# Patient Record
Sex: FEMALE | Race: WHITE | NOT HISPANIC OR LATINO | Employment: FULL TIME | URBAN - METROPOLITAN AREA
[De-identification: names, ages, dates, MRNs, and addresses within clinical notes are randomized per-mention and may not be internally consistent; named-entity substitution may affect disease eponyms.]

---

## 2017-02-08 ENCOUNTER — OFFICE VISIT (OUTPATIENT)
Dept: FAMILY MEDICINE CLINIC | Facility: CLINIC | Age: 22
End: 2017-02-08

## 2017-02-08 VITALS
HEIGHT: 64 IN | WEIGHT: 117 LBS | HEART RATE: 106 BPM | RESPIRATION RATE: 16 BRPM | SYSTOLIC BLOOD PRESSURE: 110 MMHG | OXYGEN SATURATION: 99 % | BODY MASS INDEX: 19.97 KG/M2 | DIASTOLIC BLOOD PRESSURE: 60 MMHG

## 2017-02-08 DIAGNOSIS — M25.511 ARTHRALGIA OF RIGHT ACROMIOCLAVICULAR JOINT: Primary | ICD-10-CM

## 2017-02-08 DIAGNOSIS — M75.81 TENDINITIS OF RIGHT ROTATOR CUFF: ICD-10-CM

## 2017-02-08 PROCEDURE — 99213 OFFICE O/P EST LOW 20 MIN: CPT | Performed by: FAMILY MEDICINE

## 2017-02-08 NOTE — PROGRESS NOTES
Subjective   Nely Schwarz is a 21 y.o. female who is here for   Chief Complaint   Patient presents with   • Pain     Right Shoulder X 1 week   • Shoulder Injury   .     History of Present Illness   Shoulder Pain: Patient complaints of right shoulder pain. This is evaluated as a personal injury. The pain is described as sharp.  The onset of the pain was sudden, related to recreational sports. Mechanism of injury: gripping, reaching and twist.  The pain occurs when active and lasts 5 seconds.  Location is lateral, acromioclavicular joint. No history of dislocation. Symptoms are aggravated by reaching, work at or above shoulder height, recreational sports. Symptoms are diminished by  rest.   Limited activities include: work at or above shoulder height, recreational sports. mild stiffness, no weakness, no swelling, crepitus right subarchromial region noted is reported. 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  Was reaching for a hold during a rock climbing contest with her right arm and felt a pop  Not as sore today       The following portions of the patient's history were reviewed and updated as appropriate: allergies, current medications, past medical history, past social history, past surgical history and problem list.    Review of Systems    Objective   Physical Exam   Constitutional: She appears well-nourished. No distress.   Cardiovascular: Normal rate and intact distal pulses.    Musculoskeletal:        Right shoulder: She exhibits tenderness, bony tenderness and crepitus. She exhibits normal range of motion, no swelling, no effusion, no deformity and no spasm.   Pain over AC joint and crepitus felt under acromion with ROM   Nursing note and vitals reviewed.      Assessment/Plan   Nely was seen today for pain and shoulder injury.    Diagnoses and all orders for this visit:    Arthralgia of right acromioclavicular joint    Tendinitis of right rotator cuff      Patient  Instructions   Rest shoulder 2 weeks      There are no discontinued medications.     Return if symptoms worsen or fail to improve.    Dr. Renato Reinoso  [place], [place]

## 2017-08-08 ENCOUNTER — OFFICE VISIT (OUTPATIENT)
Dept: FAMILY MEDICINE CLINIC | Facility: CLINIC | Age: 22
End: 2017-08-08

## 2017-08-08 VITALS
HEART RATE: 83 BPM | RESPIRATION RATE: 18 BRPM | SYSTOLIC BLOOD PRESSURE: 98 MMHG | WEIGHT: 107 LBS | DIASTOLIC BLOOD PRESSURE: 56 MMHG | BODY MASS INDEX: 18.27 KG/M2 | OXYGEN SATURATION: 98 % | HEIGHT: 64 IN

## 2017-08-08 DIAGNOSIS — R59.9 SWOLLEN LYMPH NODES: Primary | ICD-10-CM

## 2017-08-08 PROCEDURE — 99212 OFFICE O/P EST SF 10 MIN: CPT | Performed by: NURSE PRACTITIONER

## 2017-08-08 RX ORDER — ALBUTEROL SULFATE 90 UG/1
AEROSOL, METERED RESPIRATORY (INHALATION)
Refills: 0 | COMMUNITY
Start: 2017-06-15 | End: 2019-05-22

## 2018-02-05 ENCOUNTER — OFFICE VISIT (OUTPATIENT)
Dept: FAMILY MEDICINE CLINIC | Facility: CLINIC | Age: 23
End: 2018-02-05

## 2018-02-05 VITALS
SYSTOLIC BLOOD PRESSURE: 114 MMHG | OXYGEN SATURATION: 98 % | WEIGHT: 119 LBS | HEART RATE: 88 BPM | HEIGHT: 64 IN | TEMPERATURE: 98.5 F | BODY MASS INDEX: 20.32 KG/M2 | DIASTOLIC BLOOD PRESSURE: 72 MMHG

## 2018-02-05 DIAGNOSIS — F41.9 ANXIETY: Primary | ICD-10-CM

## 2018-02-05 PROCEDURE — 99213 OFFICE O/P EST LOW 20 MIN: CPT | Performed by: FAMILY MEDICINE

## 2018-02-05 RX ORDER — PROPRANOLOL HYDROCHLORIDE 40 MG/1
40 TABLET ORAL DAILY PRN
Qty: 20 TABLET | Refills: 1 | Status: SHIPPED | OUTPATIENT
Start: 2018-02-05 | End: 2018-10-26

## 2018-02-05 RX ORDER — NORGESTREL-ETHINYL ESTRADIOL 0.3-0.03MG
TABLET ORAL
Refills: 5 | COMMUNITY
Start: 2018-01-16 | End: 2021-08-25 | Stop reason: ALTCHOICE

## 2018-02-05 NOTE — PROGRESS NOTES
"  Chief Complaint   Patient presents with   • \"Bump in throat\"     x 2 weeks    • Sore Throat       Mouth gland Infection: Patient complains of sore on back of mouth. Symptoms include sore on right tongue Onset of symptoms was several days ago, unchanged since that time. She also c/o no  fever for the past 1 week .  She is drinking plenty of fluids. Evaluation to date: none. Treatment to date: none.      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      Assessment/Plan   Nely was seen today for \"bump in throat\" and sore throat.    Diagnoses and all orders for this visit:    Anxiety  -     propranolol (INDERAL) 40 MG tablet; Take 1 tablet by mouth Daily As Needed (anxiety).    benign tongue bump. Obs for now. Salt water gargles.           There are no Patient Instructions on file for this visit.    Tylenol or Advil as needed for pain, fever, muscle aches  Plenty of fluids  Hand washing discussed  Off work or school note given if needed.  Warm tea for throat.  Pros and cons of antibiotic use discussed    Dr. Renato Reinoso MD  Family [place], [place]  [facility]  "

## 2018-04-19 ENCOUNTER — OFFICE VISIT (OUTPATIENT)
Dept: FAMILY MEDICINE CLINIC | Facility: CLINIC | Age: 23
End: 2018-04-19

## 2018-04-19 VITALS
SYSTOLIC BLOOD PRESSURE: 130 MMHG | HEART RATE: 114 BPM | HEIGHT: 64 IN | BODY MASS INDEX: 19.97 KG/M2 | DIASTOLIC BLOOD PRESSURE: 70 MMHG | TEMPERATURE: 98.5 F | WEIGHT: 117 LBS | OXYGEN SATURATION: 100 %

## 2018-04-19 DIAGNOSIS — J03.90 TONSILLITIS: Primary | ICD-10-CM

## 2018-04-19 PROBLEM — N83.209 OVARIAN CYST: Status: ACTIVE | Noted: 2018-04-19

## 2018-04-19 PROCEDURE — 99213 OFFICE O/P EST LOW 20 MIN: CPT | Performed by: FAMILY MEDICINE

## 2018-04-19 RX ORDER — AZITHROMYCIN 250 MG/1
TABLET, FILM COATED ORAL
Qty: 6 TABLET | Refills: 0 | Status: SHIPPED | OUTPATIENT
Start: 2018-04-19 | End: 2018-10-26

## 2018-04-19 NOTE — PROGRESS NOTES
"  Chief Complaint   Patient presents with   • Other     spots on back of throat doesnt hurt to swollow       Upper Respiratory Infection: Patient complains of symptoms of a URI. Symptoms include congestion and sore throat. Onset of symptoms was several days ago, gradually improving since that time. She also c/o white spots on throat for the past 3 days .  She is drinking plenty of fluids. Evaluation to date: took a week of prednisone for a vaginal allergic reaction to a new tampon. Treatment to date: none.        Vitals:    04/19/18 0947   BP: 130/70   BP Location: Left arm   Patient Position: Sitting   Cuff Size: Adult   Pulse: 114   Temp: 98.5 °F (36.9 °C)   TempSrc: Oral   SpO2: 100%   Weight: 53.1 kg (117 lb)   Height: 162.6 cm (64\")     Gen: well appearing, alert  Ears: Tm's bulging without redness  Nose:  Congestion  Throat: pink with exudate, some drainage, tonsils with pus on the left  Neck: no LAD  Lung: , good air movement, regular RR  Heart: RR without murmur  Skin: no rash.      Assessment/Plan   Nely was seen today for other.    Diagnoses and all orders for this visit:    Tonsillitis  -     azithromycin (ZITHROMAX) 250 MG tablet; Take 2 tablets the first day, then 1 tablet daily for 4 days.    hold zpak for 72h         There are no Patient Instructions on file for this visit.    Tylenol or Advil as needed for pain, fever, muscle aches  Plenty of fluids  Hand washing discussed  Off work or school note given if needed.  Warm tea for throat.  Pros and cons of antibiotic use discussed    Dr. Renato Reinoso MD  [place]  [facility]  "

## 2018-10-26 ENCOUNTER — OFFICE VISIT (OUTPATIENT)
Dept: FAMILY MEDICINE CLINIC | Facility: CLINIC | Age: 23
End: 2018-10-26

## 2018-10-26 VITALS
HEART RATE: 82 BPM | WEIGHT: 118 LBS | BODY MASS INDEX: 20.14 KG/M2 | DIASTOLIC BLOOD PRESSURE: 74 MMHG | TEMPERATURE: 98.2 F | OXYGEN SATURATION: 99 % | HEIGHT: 64 IN | SYSTOLIC BLOOD PRESSURE: 116 MMHG | RESPIRATION RATE: 16 BRPM

## 2018-10-26 DIAGNOSIS — R20.9 SENSATION PROBLEM: Primary | ICD-10-CM

## 2018-10-26 PROCEDURE — 99212 OFFICE O/P EST SF 10 MIN: CPT | Performed by: PHYSICIAN ASSISTANT

## 2018-10-26 RX ORDER — CEFUROXIME AXETIL 250 MG/1
250 TABLET ORAL 2 TIMES DAILY
COMMUNITY
End: 2019-05-22
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
## 2019-04-26 ENCOUNTER — OFFICE VISIT (OUTPATIENT)
Dept: FAMILY MEDICINE CLINIC | Facility: CLINIC | Age: 24
End: 2019-04-26

## 2019-04-26 VITALS
BODY MASS INDEX: 20.49 KG/M2 | WEIGHT: 120 LBS | OXYGEN SATURATION: 99 % | DIASTOLIC BLOOD PRESSURE: 64 MMHG | SYSTOLIC BLOOD PRESSURE: 104 MMHG | HEART RATE: 100 BPM | HEIGHT: 64 IN

## 2019-04-26 DIAGNOSIS — T07.XXXA ABRASIONS OF MULTIPLE SITES: ICD-10-CM

## 2019-04-26 DIAGNOSIS — S99.922A FOOT INJURY, LEFT, INITIAL ENCOUNTER: ICD-10-CM

## 2019-04-26 DIAGNOSIS — S06.0X1A CLOSED HEAD INJURY WITH CONCUSSION, WITH LOSS OF CONSCIOUSNESS OF 30 MINUTES OR LESS, INITIAL ENCOUNTER: ICD-10-CM

## 2019-04-26 DIAGNOSIS — S02.2XXD CLOSED FRACTURE OF NASAL BONE WITH ROUTINE HEALING: ICD-10-CM
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
## 2019-04-26 PROCEDURE — 99214 OFFICE O/P EST MOD 30 MIN: CPT | Performed by: FAMILY MEDICINE
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
## 2019-04-30 ENCOUNTER — TELEPHONE (OUTPATIENT)
Dept: FAMILY MEDICINE CLINIC | Facility: CLINIC | Age: 24
End: 2019-04-30

## 2019-04-30 ENCOUNTER — TELEPHONE (OUTPATIENT)
Dept: ORTHOPEDIC SURGERY | Facility: CLINIC | Age: 24
End: 2019-04-30
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
## 2019-05-02 ENCOUNTER — TELEPHONE (OUTPATIENT)
Dept: FAMILY MEDICINE CLINIC | Facility: CLINIC | Age: 24
End: 2019-05-02
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
## 2019-05-22 ENCOUNTER — OFFICE VISIT (OUTPATIENT)
Dept: FAMILY MEDICINE CLINIC | Facility: CLINIC | Age: 24
End: 2019-05-22

## 2019-05-22 VITALS
HEIGHT: 64 IN | TEMPERATURE: 98.9 F | HEART RATE: 88 BPM | SYSTOLIC BLOOD PRESSURE: 110 MMHG | DIASTOLIC BLOOD PRESSURE: 64 MMHG | WEIGHT: 115 LBS | BODY MASS INDEX: 19.63 KG/M2 | OXYGEN SATURATION: 98 %

## 2019-05-22 DIAGNOSIS — S90.32XS: ICD-10-CM
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
## 2019-05-22 DIAGNOSIS — M25.562 ACUTE PAIN OF LEFT KNEE: ICD-10-CM

## 2019-05-22 PROBLEM — S90.30XA CONTUSION OF FOOT OR HEEL: Status: ACTIVE | Noted: 2019-05-02

## 2019-05-22 PROCEDURE — 99213 OFFICE O/P EST LOW 20 MIN: CPT | Performed by: FAMILY MEDICINE
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
## 2019-05-28 ENCOUNTER — TELEPHONE (OUTPATIENT)
Dept: FAMILY MEDICINE CLINIC | Facility: CLINIC | Age: 24
End: 2019-05-28
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
## 2019-05-29 ENCOUNTER — OFFICE VISIT (OUTPATIENT)
Dept: FAMILY MEDICINE CLINIC | Facility: CLINIC | Age: 24
End: 2019-05-29

## 2019-05-29 VITALS
HEIGHT: 64 IN | WEIGHT: 117 LBS | SYSTOLIC BLOOD PRESSURE: 116 MMHG | HEART RATE: 74 BPM | TEMPERATURE: 98 F | DIASTOLIC BLOOD PRESSURE: 70 MMHG | BODY MASS INDEX: 19.97 KG/M2 | OXYGEN SATURATION: 100 %
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
## 2019-05-29 PROBLEM — R20.9 SENSATION PROBLEM: Status: RESOLVED | Noted: 2018-10-26 | Resolved: 2019-05-29

## 2019-05-29 PROCEDURE — 99213 OFFICE O/P EST LOW 20 MIN: CPT | Performed by: FAMILY MEDICINE
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
## 2019-06-10 ENCOUNTER — TREATMENT (OUTPATIENT)
Dept: PHYSICAL THERAPY | Facility: CLINIC | Age: 24
End: 2019-06-10

## 2019-06-10 DIAGNOSIS — M25.562 ACUTE PAIN OF LEFT KNEE: Primary | ICD-10-CM

## 2019-06-10 PROCEDURE — 97110 THERAPEUTIC EXERCISES: CPT | Performed by: PHYSICAL THERAPIST

## 2019-06-10 PROCEDURE — 97161 PT EVAL LOW COMPLEX 20 MIN: CPT | Performed by: PHYSICAL THERAPIST

## 2019-06-10 PROCEDURE — 97035 APP MDLTY 1+ULTRASOUND EA 15: CPT | Performed by: PHYSICAL THERAPIST
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
## 2019-06-18 ENCOUNTER — TREATMENT (OUTPATIENT)
Dept: PHYSICAL THERAPY | Facility: CLINIC | Age: 24
End: 2019-06-18

## 2019-06-18 DIAGNOSIS — M25.562 ACUTE PAIN OF LEFT KNEE: Primary | ICD-10-CM

## 2019-06-18 PROCEDURE — 97110 THERAPEUTIC EXERCISES: CPT | Performed by: PHYSICAL THERAPIST

## 2019-06-18 PROCEDURE — 97035 APP MDLTY 1+ULTRASOUND EA 15: CPT | Performed by: PHYSICAL THERAPIST

## 2019-06-18 NOTE — PROGRESS NOTES
Physical Therapy Daily Progress Note        Patient: Nely Schwarz   : 1995  Diagnosis/ICD-10 Code:  Acute pain of left knee [M25.562]  Referring practitioner: Renato Reinoso MD  Date of Initial Visit: Type: THERAPY  Noted: 6/10/2019  Today's Date: 2019  Patient seen for 2 sessions         Nely Schwarz reports: no new problems since last session. She said it still bothers her but not horrible. She has been trying to get back to the gym.  She said her mobility still isn't good.         Subjective     Objective   See Exercise, Manual, and Modality Logs for complete treatment.       Assessment/Plan   Pt tolerated treatment well with complaints of muscle fatigue with most exercises however she reported knee pain with hamstring curls. She also reported pain with US which she attributed to pressure however intensity decreased as precaution. Will monitor tolerance to all exercises and progress as tolerated to decrease pain and return to previous level of function including yoga and rock climbing.    Progress per Plan of Care           Manual Therapy:         mins  60217;  Therapeutic Exercise:    30     mins  68168;     Neuromuscular Katy:        mins  14287;    Therapeutic Activity:          mins  74957;     Gait Training:           mins  58817;     Ultrasound:     8     mins  36531;    Electrical Stimulation:         mins  32520 ( );  Dry Needling          mins self-pay    Timed Treatment:   38   mins   Total Treatment:     40   mins    Kerry Bautista PTA  Physical Therapist Assistant

## 2019-06-25 ENCOUNTER — TREATMENT (OUTPATIENT)
Dept: PHYSICAL THERAPY | Facility: CLINIC | Age: 24
End: 2019-06-25

## 2019-06-25 DIAGNOSIS — M25.562 ACUTE PAIN OF LEFT KNEE: Primary | ICD-10-CM

## 2019-06-25 PROCEDURE — 97110 THERAPEUTIC EXERCISES: CPT | Performed by: PHYSICAL THERAPIST

## 2019-06-25 PROCEDURE — 97035 APP MDLTY 1+ULTRASOUND EA 15: CPT | Performed by: PHYSICAL THERAPIST

## 2019-06-25 NOTE — PROGRESS NOTES
Physical Therapy Daily Progress Note        Patient: Nely Schwarz   : 1995  Diagnosis/ICD-10 Code:  Acute pain of left knee [M25.562]  Referring practitioner: Renato Reinoso MD  Date of Initial Visit: Type: THERAPY  Noted: 6/10/2019  Today's Date: 2019  Patient seen for 3 sessions         Nely Schwarz reports: her knee is about the same.        Subjective     Objective   See Exercise, Manual, and Modality Logs for complete treatment.     Tender medial joint line      Assessment/Plan  Continued tenderness at medial joint line however decreased to min at lateral aspect and denied pain at tibial tuberosity. She tolerated progression of exercises well however continued to have pain with hamstring curls which was less compared to previous session. She also reported discomfort with sit to stands and sidestepping.  Continued with US to assist with pain control. Cues provided throughout the session for technique and monitored for appropriate progressions.  Will continue to assess tolerance and may hold weightbearing exercises that increase stress on medial knee or illicit pain.     Progress per Plan of Care           Manual Therapy:         mins  54363;  Therapeutic Exercise:    25     mins  23441;     Neuromuscular Katy:        mins  59467;    Therapeutic Activity:          mins  85898;     Gait Training:           mins  12810;     Ultrasound:     8     mins  26242;    Electrical Stimulation:         mins  37923 ( );  Dry Needling          mins self-pay    Timed Treatment:   33   mins   Total Treatment:     45   mins    Kerry Bautista PTA  Physical Therapist Assistant

## 2019-07-02 ENCOUNTER — TREATMENT (OUTPATIENT)
Dept: PHYSICAL THERAPY | Facility: CLINIC | Age: 24
End: 2019-07-02

## 2019-07-02 DIAGNOSIS — M25.562 ACUTE PAIN OF LEFT KNEE: Primary | ICD-10-CM

## 2019-07-02 PROCEDURE — 97110 THERAPEUTIC EXERCISES: CPT | Performed by: PHYSICAL THERAPIST

## 2019-07-02 PROCEDURE — 97035 APP MDLTY 1+ULTRASOUND EA 15: CPT | Performed by: PHYSICAL THERAPIST

## 2019-07-02 NOTE — PROGRESS NOTES
Physical Therapy Daily Progress Note        Patient: Nely Schwarz   : 1995  Diagnosis/ICD-10 Code:  Acute pain of left knee [M25.562]  Referring practitioner: Renato Reinoso MD  Date of Initial Visit: Type: THERAPY  Noted: 6/10/2019  Today's Date: 2019  Patient seen for 4 sessions         Nely Schwarz reports: it is feeling better and she has been working on standing equally.        Subjective     Objective   See Exercise, Manual, and Modality Logs for complete treatment.     Decreased tenderness palpation to (L) medial knee    Assessment/Plan  Pt reported decreased tenderness to palpation compared to previous session. She reported decreased pain with sit to stands, hamstring curls, and sidestepping with attention to proper muscle activation and technique. She was able to progress exercises and initiate the bike. She reported fatigue from exercises however no significant increased pain. Continued with US to address tenderness. Plan to add low step ups next session if tolerated.     Progress per Plan of Care           Manual Therapy:         mins  12596;  Therapeutic Exercise:    37     mins  44119;     Neuromuscular Katy:        mins  41169;    Therapeutic Activity:          mins  28846;     Gait Training:           mins  66233;     Ultrasound:     8     mins  81026;    Electrical Stimulation:         mins  40310 ( );  Dry Needling          mins self-pay    Timed Treatment:   45   mins   Total Treatment:     45   mins    Kerry Bautista PTA  Physical Therapist Assistant

## 2019-07-09 ENCOUNTER — TREATMENT (OUTPATIENT)
Dept: PHYSICAL THERAPY | Facility: CLINIC | Age: 24
End: 2019-07-09

## 2019-07-09 DIAGNOSIS — M25.562 ACUTE PAIN OF LEFT KNEE: Primary | ICD-10-CM

## 2019-07-09 PROCEDURE — 97530 THERAPEUTIC ACTIVITIES: CPT | Performed by: PHYSICAL THERAPIST

## 2019-07-09 PROCEDURE — 97110 THERAPEUTIC EXERCISES: CPT | Performed by: PHYSICAL THERAPIST

## 2019-07-09 NOTE — PROGRESS NOTES
Physical Therapy Daily Progress Note    Visit # : 5  Nely Schwarz reports: pt rates her left knee pain a 0-1/10 and almost feels like she is back to normal.  Pt is still having difficulty doing lunges secondary to feeling pain underneath her knee cap.  Pt is able to go to the gym with minimal pain in left knee.       Subjective     Objective       Tenderness   Left Knee   Tenderness in the inferior patella, lateral patella and medial patella.     Additional Tenderness Details  Pt with minimal tenderness left patella     Active Range of Motion   Left Knee   Flexion: 151 degrees   Extension: 0 degrees     Patellar Mobility   Left Knee Patellar tendons within functional limits include the medial, lateral, superior and inferior.     Additional Patellar Mobility Details  Pt with lateral patella tracking with the last 10-20 degrees TKE left knee      Strength/Myotome Testing     Left Hip   Planes of Motion   Flexion: 5  Abduction: 5  Adduction: 4    Left Knee   Flexion: 5  Extension: 5    Tests     Left Knee   Negative patella-femoral grind.      See Exercise, Manual, and Modality Logs for complete treatment.       Assessment & Plan     Assessment  Assessment details: Pt is making great progress with PT.  Pt is having decreased pain, decreased tenderness, improved knee and hip strength, and improved ROM.  Pt has met all STGs and making progress towards LTGs.  Increased all strengthening exercises without pain.  Will continue to see pt once per week for another 2-3 weeks working on exercise progression to return to the gym without pain.          Progress per Plan of Care           Manual Therapy:         mins  01034;  Therapeutic Exercise:   35      mins  01080;     Neuromuscular Katy:        mins  11901;    Therapeutic Activity:    10      mins  00106;     Gait Training:           mins  04491;     Ultrasound:          mins  00085;    Electrical Stimulation:         mins  38556 ( );  Dry Needling          mins  self-pay    Timed Treatment:  45    mins   Total Treatment:    57    mins    Lani Sim, PT  Physical Therapist

## 2019-07-16 ENCOUNTER — TREATMENT (OUTPATIENT)
Dept: PHYSICAL THERAPY | Facility: CLINIC | Age: 24
End: 2019-07-16

## 2019-07-16 DIAGNOSIS — M25.562 ACUTE PAIN OF LEFT KNEE: Primary | ICD-10-CM

## 2019-07-16 PROCEDURE — 97035 APP MDLTY 1+ULTRASOUND EA 15: CPT | Performed by: PHYSICAL THERAPIST

## 2019-07-16 PROCEDURE — 97110 THERAPEUTIC EXERCISES: CPT | Performed by: PHYSICAL THERAPIST

## 2019-07-16 NOTE — PROGRESS NOTES
Physical Therapy Daily Progress Note        Patient: Nely Schwarz   : 1995  Diagnosis/ICD-10 Code:  Acute pain of left knee [M25.562]  Referring practitioner: Renato Reinoso MD  Date of Initial Visit: Type: THERAPY  Noted: 6/10/2019  Today's Date: 2019  Patient seen for 6 sessions         Nely Schwarz reports: her knee is not doing good today. She said she did a lot this weekend with [place], rollerblading, and the gym. She said it is hurting at a 2/10.         Subjective     Objective   See Exercise, Manual, and Modality Logs for complete treatment.     Tender medial and lateral patella    Assessment/Plan  Reintroduced US due to pt c/o increased tenderness with palpation and increased soreness with increased activity over the weekend. Due to these reports and increased discomfort throughout the session, maintained all exercises this date however closely monitored to ensure tolerance. Pt reported stinging pain behind her patella during sit to stands especially if she wasn't focused on technique. Educated pt to increase use of ice at home and add ice massage to assist with pain control. Plan to add single leg exercises including SLS and lunges if tolerated next session.     Progress per Plan of Care           Manual Therapy:         mins  01735;  Therapeutic Exercise:    35     mins  20794;     Neuromuscular Katy:        mins  97712;    Therapeutic Activity:          mins  31580;     Gait Training:           mins  13906;     Ultrasound:     8     mins  82060;    Electrical Stimulation:         mins  81729 ( );  Dry Needling          mins self-pay    Timed Treatment:   43   mins   Total Treatment:     55   mins    Kerry Bautista PTA  Physical Therapist Assistant

## 2019-07-23 ENCOUNTER — TREATMENT (OUTPATIENT)
Dept: PHYSICAL THERAPY | Facility: CLINIC | Age: 24
End: 2019-07-23

## 2019-07-23 DIAGNOSIS — M25.562 ACUTE PAIN OF LEFT KNEE: Primary | ICD-10-CM

## 2019-07-23 PROCEDURE — 97110 THERAPEUTIC EXERCISES: CPT | Performed by: PHYSICAL THERAPIST

## 2019-07-23 NOTE — PROGRESS NOTES
Physical Therapy Daily Progress Note        Patient: Nely Schwarz   : 1995  Diagnosis/ICD-10 Code:  Acute pain of left knee [M25.562]  Referring practitioner: Renato Reinoso MD  Date of Initial Visit: Type: THERAPY  Noted: 6/10/2019  Today's Date: 2019  Patient seen for 7 sessions         Nely Schwarz reports: her knee feels better. She said no problems since last session and she thinks taking it easy helped after her busy weekend a week ago.         Subjective     Objective   See Exercise, Manual, and Modality Logs for complete treatment.     Denied tenderness to palpation      Assessment/Plan  Pt reported fatigue from progression of exercises however no increased complaints of pain including the addition of lunges and SLS.  She demonstrated good technique with lunges however reported uneven weightshift thus educated pt to limit range with lunges to increase weightbearing through (L).  Pt cleared to complete lunges at the gym.  She denied tenderness to palpation this date thus held US and utilized ice only for pain control. Discussed discharge next session per previous reassessment by PT however instructed pt to monitor symptoms with progressions and assess normal daily function for issues to report at next session.     Progress per Plan of Care           Manual Therapy:         mins  88671;  Therapeutic Exercise:    40     mins  87874;     Neuromuscular Katy:        mins  64395;    Therapeutic Activity:          mins  67214;     Gait Training:           mins  12879;     Ultrasound:          mins  33346;    Electrical Stimulation:         mins  56237 ( );  Dry Needling          mins self-pay    Timed Treatment:   40   mins   Total Treatment:     55   mins    Kerry Bautista PTA  Physical Therapist Assistant

## 2019-08-01 ENCOUNTER — TREATMENT (OUTPATIENT)
Dept: PHYSICAL THERAPY | Facility: CLINIC | Age: 24
End: 2019-08-01

## 2019-08-01 DIAGNOSIS — M25.562 ACUTE PAIN OF LEFT KNEE: Primary | ICD-10-CM

## 2019-08-01 PROCEDURE — 97530 THERAPEUTIC ACTIVITIES: CPT | Performed by: PHYSICAL THERAPIST

## 2019-08-01 PROCEDURE — 97110 THERAPEUTIC EXERCISES: CPT | Performed by: PHYSICAL THERAPIST

## 2019-08-01 NOTE — PROGRESS NOTES
Physical Therapy Daily Progress Note    Visit # : 8  Nely Schwarz reports: she is doing pretty good.  Pt still has some pain with lunges and different work-outs at the gym.  Pt rates her left knee pain a 1-2/10 on a daily average.      Subjective     Objective       Tenderness   Left Knee   Tenderness in the medial patella.     Additional Tenderness Details  Slight point tenderness left medial patella     Active Range of Motion   Left Knee   Flexion: 152 degrees     Strength/Myotome Testing     Left Hip   Planes of Motion   Flexion: 5  Abduction: 5  Adduction: 4    Left Knee   Flexion: 5  Extension: 5     See Exercise, Manual, and Modality Logs for complete treatment.       Assessment & Plan     Assessment  Assessment details: Pt is doing very well and has returned to the gym with minimal knee pain.  Discussed with pt about modifying some of her exercises to prevent PF pain and to continue strengthening quad muscles in a pain free range. Pt has improved strength, decreased pain and tenderness, but still needs hip add/VMO strengthening.  Pt has met all STGs and 2/4 LTGs.  Feel pt can continue to gain strength with continuing her daily HEP.  Discharged pt today with HEP.           Other           Manual Therapy:         mins  07176;  Therapeutic Exercise:    35     mins  39997;     Neuromuscular Katy:        mins  03045;    Therapeutic Activity:    10      mins  63684;     Gait Training:           mins  94556;     Ultrasound:          mins  14404;    Electrical Stimulation:         mins  53465 ( );  Dry Needling          mins self-pay    Timed Treatment:   45   mins   Total Treatment:     61   mins    Lani Sim, PT  Physical Therapist

## 2019-08-20 ENCOUNTER — OFFICE VISIT (OUTPATIENT)
Dept: FAMILY MEDICINE CLINIC | Facility: CLINIC | Age: 24
End: 2019-08-20

## 2019-08-20 VITALS
SYSTOLIC BLOOD PRESSURE: 118 MMHG | HEART RATE: 97 BPM | WEIGHT: 118.3 LBS | HEIGHT: 64 IN | BODY MASS INDEX: 20.2 KG/M2 | OXYGEN SATURATION: 100 % | DIASTOLIC BLOOD PRESSURE: 80 MMHG
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
## 2019-08-20 DIAGNOSIS — Z87.828 HISTORY OF TRAUMATIC HEAD INJURY: ICD-10-CM

## 2019-08-20 PROCEDURE — 99214 OFFICE O/P EST MOD 30 MIN: CPT | Performed by: FAMILY MEDICINE

## 2019-08-20 RX ORDER — DIAZEPAM 5 MG/1
5 TABLET ORAL 2 TIMES DAILY PRN
Qty: 1 TABLET | Refills: 0 | Status: SHIPPED | OUTPATIENT
Start: 2019-08-20 | End: 2019-11-11
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
## 2019-11-11 ENCOUNTER — OFFICE VISIT (OUTPATIENT)
Dept: FAMILY MEDICINE CLINIC | Facility: CLINIC | Age: 24
End: 2019-11-11

## 2019-11-11 VITALS
HEART RATE: 94 BPM | TEMPERATURE: 99.4 F | OXYGEN SATURATION: 99 % | DIASTOLIC BLOOD PRESSURE: 60 MMHG | SYSTOLIC BLOOD PRESSURE: 110 MMHG | BODY MASS INDEX: 19.97 KG/M2 | WEIGHT: 117 LBS | HEIGHT: 64 IN | RESPIRATION RATE: 16 BRPM

## 2019-11-11 DIAGNOSIS — R22.2 SUBCUTANEOUS NODULE OF ABDOMINAL WALL: Primary | ICD-10-CM

## 2019-11-11 PROCEDURE — 99213 OFFICE O/P EST LOW 20 MIN: CPT | Performed by: NURSE PRACTITIONER

## 2019-11-11 RX ORDER — ALBUTEROL SULFATE 90 UG/1
AEROSOL, METERED RESPIRATORY (INHALATION) SEE ADMIN INSTRUCTIONS
Refills: 0 | COMMUNITY
Start: 2019-09-23 | End: 2020-06-25

## 2019-11-11 NOTE — PROGRESS NOTES
Patient ID: Nely Schwarz is a 24 y.o. female     Subjective     Chief Complaint   Patient presents with   • Abdominal Pain       History of Present Illness    Nely Schwarz presents to the office today for abdominal pain which started about 2 weeks.  She then started palpating same area 2 days ago and noted 2 palpable lumps which are tender.  Has history of right ovarian cyst which is being monitored.  Has had recent GYN exam which was normal about 4-5 weeks ago.  No recent heavy lifting, pushing, pulling.    Recently started new job which is stressful.   She is unsure if related.      She denies any complaints of fever, chills, night sweats, unintentional weight loss, cough, chest pain, shortness of air, abdominal pain, nausea, or any other concerns.      The following portions of the patient's history were reviewed and updated as appropriate: allergies, current medications, past family history, past medical history, past social history, past surgical history and problem list.       Review of Systems   Constitution: Negative.   HENT: Negative.    Eyes: Negative.    Cardiovascular: Negative.    Respiratory: Negative.    Endocrine: Negative.    Hematologic/Lymphatic: Negative.    Skin: Negative.    Musculoskeletal: Negative.    Gastrointestinal: Positive for abdominal pain.   Genitourinary: Negative.    Neurological: Negative.    Psychiatric/Behavioral: Negative.        Vitals:    11/11/19 1403   BP: 110/60   Pulse: 94   Resp: 16   Temp: 99.4 °F (37.4 °C)   SpO2: 99%       Documented weights    11/11/19 1403   Weight: 53.1 kg (117 lb)     Body mass index is 20.08 kg/m².    No results found for this or any previous visit.    Objective     Physical Exam   Constitutional: She appears well-developed and well-nourished. No distress.   HENT:   Head: Normocephalic and atraumatic.   Neck: Normal range of motion.   Cardiovascular: Normal rate and regular rhythm.   Pulmonary/Chest: Effort normal and breath sounds normal.    Abdominal: Soft. Bowel sounds are normal. She exhibits no distension. There is no hepatomegaly. No hernia.       2 tender, palpable nodules noted to right side of abdomen measuring about 1 cm each.     Lymphadenopathy:        Head (right side): No submandibular, no posterior auricular and no occipital adenopathy present.        Head (left side): No submandibular, no posterior auricular and no occipital adenopathy present.     She has no cervical adenopathy.        Right cervical: No superficial cervical adenopathy present.    She has no axillary adenopathy.        Right: No supraclavicular adenopathy present.        Left: No supraclavicular adenopathy present.   Skin: Skin is warm and dry. No rash noted. No erythema.          Assessment/Plan     Assessment/Plan     Nely was seen today for abdominal pain.    Diagnoses and all orders for this visit:    Subcutaneous nodule of abdominal wall  -     US soft tissue; Future      Summary:  Nely Schwarz presents to office today due to 2 tender palpable nodules to the right side of abdomen.  On exam today appears to be related to cyst.  Will obtain ultrasound for further evaluation.  In the meantime, instructed applying heating pad to area which may help.      In the meantime, instructed to contact us sooner for any problems or concerns.    Alessandra Villasenor, APRN  Family Medicine  [facility]  11/11/19  2:24 PM

## 2019-11-18 ENCOUNTER — HOSPITAL ENCOUNTER (OUTPATIENT)
Dept: ULTRASOUND IMAGING | Facility: HOSPITAL | Age: 24
Discharge: HOME | End: 2019-11-18
Admitting: NURSE PRACTITIONER

## 2019-11-18 DIAGNOSIS — R22.2 SUBCUTANEOUS NODULE OF ABDOMINAL WALL: ICD-10-CM

## 2019-11-18 PROCEDURE — 76857 US EXAM PELVIC LIMITED: CPT

## 2019-11-20 ENCOUNTER — TELEPHONE (OUTPATIENT)
Dept: FAMILY MEDICINE CLINIC | Facility: CLINIC | Age: 24
End: 2019-11-20

## 2019-11-20 NOTE — TELEPHONE ENCOUNTER
Give her a call  The results of the abdominal wall are reassuring but did not give a clear diagnosis,  Have her come in tomorrow or Friday so i can examine the skin nodules

## 2019-11-21 ENCOUNTER — OFFICE VISIT (OUTPATIENT)
Dept: FAMILY MEDICINE CLINIC | Facility: CLINIC | Age: 24
End: 2019-11-21

## 2019-11-21 VITALS
HEIGHT: 64 IN | BODY MASS INDEX: 19.97 KG/M2 | WEIGHT: 117 LBS | SYSTOLIC BLOOD PRESSURE: 118 MMHG | HEART RATE: 81 BPM | DIASTOLIC BLOOD PRESSURE: 78 MMHG | OXYGEN SATURATION: 98 %

## 2019-11-21 DIAGNOSIS — R22.9 MULTIPLE SKIN NODULES: Primary | ICD-10-CM

## 2019-11-21 PROBLEM — S02.2XXD CLOSED FRACTURE OF NASAL BONE WITH ROUTINE HEALING: Status: RESOLVED | Noted: 2019-04-26 | Resolved: 2019-11-21

## 2019-11-21 PROBLEM — M25.562 ACUTE PAIN OF LEFT KNEE: Status: RESOLVED | Noted: 2019-05-22 | Resolved: 2019-11-21

## 2019-11-21 PROBLEM — S90.30XA CONTUSION OF FOOT OR HEEL: Status: RESOLVED | Noted: 2019-05-02 | Resolved: 2019-11-21

## 2019-11-21 PROCEDURE — 99213 OFFICE O/P EST LOW 20 MIN: CPT | Performed by: FAMILY MEDICINE
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
## 2020-02-05 ENCOUNTER — TELEPHONE (OUTPATIENT)
Dept: FAMILY MEDICINE CLINIC | Facility: CLINIC | Age: 25
End: 2020-02-05

## 2020-02-05 NOTE — TELEPHONE ENCOUNTER
The doppler US last November revealed small less than 1 cm benign masses which did not have blood flow to them.  So possibilities are lipoma or old dead lymph node or old calcified skin cyst    I will be glad to see her in the office again for a re exam of the area.

## 2020-02-05 NOTE — TELEPHONE ENCOUNTER
Patient mother has several concerns regarding daughters nodules in her stomach. She would like to know how we know they are not hematomas, where did they come from and will they go away? She said patient is complaining they are bothering her again. Please advise.

## 2020-05-08 ENCOUNTER — OFFICE VISIT (OUTPATIENT)
Dept: FAMILY MEDICINE CLINIC | Facility: CLINIC | Age: 25
End: 2020-05-08

## 2020-05-08 ENCOUNTER — TELEPHONE (OUTPATIENT)
Dept: FAMILY MEDICINE CLINIC | Facility: CLINIC | Age: 25
End: 2020-05-08

## 2020-05-08 ENCOUNTER — NURSE TRIAGE (OUTPATIENT)
Dept: CALL CENTER | Facility: HOSPITAL | Age: 25
End: 2020-05-08
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
## 2020-05-08 PROCEDURE — 99441 PR PHYS/QHP TELEPHONE EVALUATION 5-10 MIN: CPT | Performed by: FAMILY MEDICINE

## 2020-05-08 NOTE — PROGRESS NOTES
Subjective   Nely Schwarz is a 24 y.o. female who is here for   Chief Complaint   Patient presents with   • Animal Bite   .     History of Present Illness     Today is telephone medical visit between Dr Renato Reinoso and current patient.  Consent is given by patient for his encounter.  This is occurring during the current COVID 19 pandemic with social isolation mandates per federal and state guidelines. Patient's chart has been reviewed.  10 min call    Ms. Schwarz is a pleasant 24-year-old female who calls in today regarding a dog bite.  A few days ago she was visiting friends and 1 of the friends brought their small pet dog and the dog nipped and bit her wrist.  She quickly pulled the wrist away was not a significant bite she reports more of a scraping action and less of a puncture wound.  It was little sore for couple days and today it is a little bit red and swollen around the scrape marks.  There is no streaking up the arm there is no fever.  She is up-to-date on her tetanus.  The fact that it is a domestic dog who is a patent risk of rabies is quite low.  Given his a telephone visit not able to see the wound we described thing that she may need to worry about more red swelling bleeding fluid streaking up the arm, if that were to occur she should go to the emergency room this weekend.  Continue soap and water once or twice a day cover any wounds or scabs with Neosporin and she should be fine a couple days.    The following portions of the patient's history were reviewed and updated as appropriate: allergies, current medications, past family history, past medical history, past social history, past surgical history and problem list.    Review of Systems   Constitutional: Negative for fever.   Skin: Positive for wound.   Hematological: Negative for adenopathy.       Objective   Physical Exam    Assessment/Plan   Nely was seen today for animal bite.    Diagnoses and all orders for this visit:    Dog bite,  initial encounter      There are no Patient Instructions on file for this visit.    There are no discontinued medications.     No follow-ups on file.    Dr. Renato Reinoso  [place], [place]

## 2020-05-08 NOTE — TELEPHONE ENCOUNTER
Reason for Disposition  • [1] Last tetanus shot > 10 years ago AND [2] any wound (e.g., cut, scrape)    Additional Information  • Negative: [1] Major bleeding (e.g., actively dripping or spurting) AND [2] can't be stopped  • Negative: Sounds like a life-threatening emergency to the triager  • Negative: Snake bite  • Negative: Bite, wound, or sting from fish  • Negative: [1] Any break in skin from BITE (e.g., cut, puncture or scratch) AND[2] WILD animal at risk for RABIES (e.g., bat, raccoon, ochoa, skunk, coyote, other carnivores)  • Negative: [1] Any break in skin from BITE (e.g., cut, puncture or scratch) AND[2] PET animial (e.g., dog, cat, or ferret) at risk for RABIES (e.g., sick, stray, unprovoked bite, developing country)  • Negative: [1] Any break in skin from BITE (e.g., cut, puncture or scratch) AND[2] monkey  • Negative: [1] EXPOSURE of non-intact skin (e.g., exposed person has dermatitis, abrasion, wound) AND[2] with animal BODY FLUID (e.g., saliva such as licking, blood, brain) AND[3] animal at high-risk for RABIES (e.g., bat, raccoon, ochoa, skunk, coyote, other carnivores)  • Negative: [1] Cut (length > 1/8 inch or 3 mm) or skin tear AND [2] any animal  • Negative: [1] Bleeding AND [2] won't stop after 10 minutes of direct pressure (using correct technique)  • Negative: Description of bite sounds severe to the triager  • Negative: [1] Puncture wound (hole through the skin) AND [2] from a cat bite (or deep claw puncture wound)  • Negative: [1] Puncture wound or small cut AND [2] on face  • Negative: [1] Puncture wound or small cut AND [2] on hands or genitals  • Negative: [1] Puncture wound or small cut AND [2] weak immune system (e.g., HIV positive, cancer chemo, splenectomy, organ transplant, chronic steroids)  • Negative: Looks infected (red area, red streak, or pus)  • Negative: [1] No bite dominic or scratch AND [2] suspicious bat exposure (e.g., bat found in same room as sleeping adult)  • Negative:  "[1] Taking antibiotic > 24 hours for infected bite AND [2] bite getting worse (more swollen, more redness and increased pain)  • Negative: [1] Taking antibiotic > 48 hours (2 days) for infected bite AND [2] fever persists  • Negative: [1] Taking antibiotic > 72 hours (3 days) for infected bite AND [2] has not improved (i.e., pain, pus, redness)  • Negative: [1] No prior tetanus shots (or is not fully vaccinated) AND [2] any wound (e.g., cut, scrape)    Answer Assessment - Initial Assessment Questions  1. ANIMAL: \"What type of animal caused the bite?\" \"Is the injury from a bite or a claw?\" If the animal is a dog or a cat, ask: \"Was it a pet or a stray?\" \"Was it acting ill or behaving strangely?\"      It did bite, it was a pet. The dog is aggressive.   2. LOCATION: \"Where is the bite located?\"       Wrist to the right   3. SIZE: \"How big is the bite?\" \"What does it look like?\"       Small 2 cm   4. ONSET: \"When did the bite happen?\" (Minutes or hours ago)       Yesterday   5. CIRCUMSTANCES: \"Tell me how this happened.\"       She was at the boyfriend  Friends house.  6. TETANUS: \"When was the last tetanus booster?\"      She does not know   7. PREGNANCY: \"Is there any chance you are pregnant?\" \"When was your last menstrual period?\"      n    Protocols used: ANIMAL BITE-ADULT-      "

## 2020-05-08 NOTE — TELEPHONE ENCOUNTER
MOTHER REQUESTED TO KNOW IF DAUGHTER SHOULD BE TESTED FOR HAVING RABIES SINCE SHE WAS BITTEN BY A DOG. MOTHER REQUESTED FOR  TO CALL HER ABOUT THIS MATTER. PLEASE ADVISE

## 2020-05-08 NOTE — TELEPHONE ENCOUNTER
"    Reason for Disposition  • Caller has medication question, adult has minor symptoms, caller declines triage, and triager answers question    Additional Information  • Negative: Drug overdose and nurse unable to answer question  • Negative: Caller requesting information not related to medicine  • Negative: Caller requesting a prescription for Strep throat and has a positive culture result  • Negative: Rash while taking a medication or within 3 days of stopping it  • Negative: Immunization reaction suspected  • Negative: [1] Asthma and [2] having symptoms of asthma (cough, wheezing, etc)  • Negative: MORE THAN A DOUBLE DOSE of a prescription or over-the-counter (OTC) drug  • Negative: [1] DOUBLE DOSE (an extra dose or lesser amount) of over-the-counter (OTC) drug AND [2] any symptoms (e.g., dizziness, nausea, pain, sleepiness)  • Negative: [1] DOUBLE DOSE (an extra dose or lesser amount) of prescription drug AND [2] any symptoms (e.g., dizziness, nausea, pain, sleepiness)  • Negative: Took another person's prescription drug  • Negative: [1] DOUBLE DOSE (an extra dose or lesser amount) of prescription drug AND [2] NO symptoms (Exception: a double dose of antibiotics)  • Negative: Diabetes drug error or overdose (e.g., insulin or extra dose)  • Negative: [1] Request for URGENT new prescription or refill of \"essential\" medication (i.e., likelihood of harm to patient if not taken) AND [2] triager unable to fill per unit policy  • Negative: [1] Prescription not at pharmacy AND [2] was prescribed today by PCP  • Negative: Pharmacy calling with prescription questions and triager unable to answer question  • Negative: Caller has URGENT medication question about med that PCP prescribed and triager unable to answer question  • Negative: Caller has NON-URGENT medication question about med that PCP prescribed and triager unable to answer question  • Negative: Caller requesting a NON-URGENT new prescription or refill and " "triager unable to refill per unit policy  • Negative: Caller has medication question about med not prescribed by PCP and triager unable to answer question (e.g., compatibility with other med, storage)  • Negative: [1] DOUBLE DOSE (an extra dose or lesser amount) of over-the-counter (OTC) drug AND [2] NO symptoms  • Negative: [1] DOUBLE DOSE (an extra dose or lesser amount) of antibiotic drug AND [2] NO symptoms  • Negative: Caller has medication question only, adult not sick, and triager answers question    Answer Assessment - Initial Assessment Questions  1. SYMPTOMS: \"Do you have any symptoms?\"      Mother wants to know since tetanus is up to date does her daughter still need to take antibiotic that was ordered by the doctor for her dog bite to rt wrist? It was a nip but did break the skin.  Told her yes take medication as ordered.   2. SEVERITY: If symptoms are present, ask \"Are they mild, moderate or severe?\"      Minor    Protocols used: MEDICATION QUESTION CALL-ADULT-      "

## 2020-06-25 ENCOUNTER — OFFICE VISIT (OUTPATIENT)
Dept: FAMILY MEDICINE CLINIC | Facility: CLINIC | Age: 25
End: 2020-06-25

## 2020-06-25 VITALS
SYSTOLIC BLOOD PRESSURE: 118 MMHG | HEART RATE: 76 BPM | WEIGHT: 117 LBS | HEIGHT: 64 IN | OXYGEN SATURATION: 100 % | DIASTOLIC BLOOD PRESSURE: 78 MMHG | BODY MASS INDEX: 19.97 KG/M2

## 2020-06-25 DIAGNOSIS — F41.9 ANXIETY: Primary | ICD-10-CM

## 2020-06-25 PROBLEM — M75.81 TENDINITIS OF RIGHT ROTATOR CUFF: Status: RESOLVED | Noted: 2017-02-08 | Resolved: 2020-06-25
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
## 2020-06-25 PROCEDURE — 99213 OFFICE O/P EST LOW 20 MIN: CPT | Performed by: FAMILY MEDICINE

## 2020-06-25 RX ORDER — BUSPIRONE HYDROCHLORIDE 15 MG/1
15 TABLET ORAL 3 TIMES DAILY PRN
Qty: 30 TABLET | Refills: 2 | Status: SHIPPED | OUTPATIENT
Start: 2020-06-25 | End: 2020-07-23

## 2020-06-25 NOTE — PROGRESS NOTES
Subjective   Nely Schwarz is a 24 y.o. female who is here for   Chief Complaint   Patient presents with   • Anxiety     sleeping issues   .     History of Present Illness   Anxiety: Patient complains of anxiety disorder and sleep disturbance.  She has the following symptoms: difficulty concentrating, dizziness, fatigue, feelings of losing control, insomnia, irritable, palpitations, paresthesias, psychomotor agitation, racing thoughts, shortness of breath, sweating. Onset of symptoms was approximately a few months ago, gradually worsening since that time. She denies current suicidal and homicidal ideation. Family history significant for anxiety.Possible organic causes contributing are: none. Risk factors: none Previous treatment includes none and none.  She complains of the following side effects from the treatment: none.  Off and on anxiety for years  Now working from home on computer  Much less social  Worry about COVID 19        The following portions of the patient's history were reviewed and updated as appropriate: allergies, current medications, past family history, past medical history, past social history, past surgical history and problem list.    Review of Systems    Objective   Physical Exam   Constitutional: She appears well-developed and well-nourished.   Cardiovascular: Normal rate.   Psychiatric: She has a normal mood and affect.   Nursing note and vitals reviewed.      Assessment/Plan   Nely was seen today for anxiety.    Diagnoses and all orders for this visit:    Anxiety  -     busPIRone (BUSPAR) 15 MG tablet; Take 1 tablet by mouth 3 (Three) Times a Day As Needed (anxiety).      There are no Patient Instructions on file for this visit.    Medications Discontinued During This Encounter   Medication Reason   • VENTOLIN  (90 Base) MCG/ACT inhaler *Therapy completed        Return in about 1 month (around 7/25/2020) for zoom visit.    Dr. Renato Reinoso  [facility]  [place], [place]

## 2020-07-23 ENCOUNTER — TELEMEDICINE (OUTPATIENT)
Dept: FAMILY MEDICINE CLINIC | Facility: CLINIC | Age: 25
End: 2020-07-23

## 2020-07-23 DIAGNOSIS — F41.9 ANXIETY: ICD-10-CM

## 2020-07-23 PROCEDURE — 99213 OFFICE O/P EST LOW 20 MIN: CPT | Performed by: FAMILY MEDICINE

## 2020-07-23 RX ORDER — BUSPIRONE HYDROCHLORIDE 15 MG/1
15 TABLET ORAL NIGHTLY PRN
Qty: 30 TABLET | Refills: 2
Start: 2020-07-23 | End: 2021-08-25

## 2020-07-23 NOTE — PROGRESS NOTES
Subjective   Nely Schwarz is a 24 y.o. female who is here for   Chief Complaint   Patient presents with   • Anxiety   .     History of Present Illness   Unable to complete visit using a video connection to the patient. A phone visit was used to complete this visits. Total time of discussion was 8 minutes.    Today is telephone medical visit between Dr Renato Reinoso and current patient.  Consent is given by patient for his encounter.  This is occurring during the current COVID 19 pandemic with social isolation mandates per federal and state guidelines. Patient's chart has been reviewed.    I called her, as she forgot about the appointment    The Buspar caused grogginess, even 1/2 a pill    But overall anxiety is better on its own    Maybe just take the Buspar at bedtime if needed.      The following portions of the patient's history were reviewed and updated as appropriate: allergies, current medications, past family history, past medical history, past social history, past surgical history and problem list.    Review of Systems   Constitutional: Positive for fatigue. Negative for fever.   Psychiatric/Behavioral: Positive for sleep disturbance. The patient is nervous/anxious.        Objective   Physical Exam    Assessment/Plan   Nely was seen today for anxiety.    Diagnoses and all orders for this visit:    Anxiety  -     busPIRone (BUSPAR) 15 MG tablet; Take 1 tablet by mouth At Night As Needed (anxiety).      There are no Patient Instructions on file for this visit.    Medications Discontinued During This Encounter   Medication Reason   • busPIRone (BUSPAR) 15 MG tablet         No follow-ups on file.    Dr. Renato Reinoso  [place], [place]

## 2020-07-31 ENCOUNTER — OFFICE VISIT (OUTPATIENT)
Dept: FAMILY MEDICINE CLINIC | Facility: CLINIC | Age: 25
End: 2020-07-31

## 2020-07-31 DIAGNOSIS — J02.9 ACUTE PHARYNGITIS, UNSPECIFIED ETIOLOGY: Primary | ICD-10-CM

## 2020-07-31 PROCEDURE — 99213 OFFICE O/P EST LOW 20 MIN: CPT | Performed by: NURSE PRACTITIONER

## 2020-07-31 RX ORDER — AMOXICILLIN 250 MG/5ML
POWDER, FOR SUSPENSION ORAL
Qty: 200 ML | Refills: 0 | Status: SHIPPED | OUTPATIENT
Start: 2020-07-31 | End: 2020-12-22

## 2020-07-31 NOTE — PROGRESS NOTES
Subjective      Chief Complaint   Patient presents with   • Sore Throat     Nely Schwarz is a 24 y.o. who presents today for a telephone visit with complaints of sore throat.     Time spent caring for the patient was 5 - 10 min.    You have chosen to receive care through a telephone visit. Do you consent to use a telephone visit for your medical care today? Yes    Nely Schwarz is a 24 y.o. female who presents for evaluation of sore throat. Associated symptoms include enlarged tonsils, pain while swallowing, sore throat and white spots in throat. Onset of symptoms was 2 days ago, and have been gradually worsening since that time. She is drinking moderate amounts of fluids. She has not had a recent close exposure to someone with proven streptococcal pharyngitis.    The following portions of the patient's history were reviewed and updated as appropriate: allergies, current medications, past family history, past medical history, past social history, past surgical history and problem list.    Review of Systems  A comprehensive review of systems was negative except for: Ears, nose, mouth, throat, and face: positive for sore throat     Objective    No distress noted.  AAO X 3    Laboratory  Telephone visit. Strep test not done. Results:N/A.    Assessment/Plan   Acute pharyngitis, likely   Pharyngitis.    Current condition does not sound related to COVID due to no other associated symptoms such as cough, shortness of breath, fever, sudden loss of taste or smell.  Also she has had prior history of strep throat infections in the past.  She feels current symptoms similar to previous infections.  Patient placed on antibiotics, amoxicillin 500 mg bid for 10 days.    Use of OTC analgesics recommended as well as salt water gargles.  Use of decongestant recommended.  Follow up as needed.   Instructed to notify us if her symptoms worsen or do not improve.    Alessandra Villasenor, APRN

## 2020-08-21 ENCOUNTER — TELEPHONE (OUTPATIENT)
Dept: FAMILY MEDICINE CLINIC | Facility: CLINIC | Age: 25
End: 2020-08-21

## 2020-08-21 RX ORDER — ALBUTEROL SULFATE 90 UG/1
2 AEROSOL, METERED RESPIRATORY (INHALATION) EVERY 4 HOURS PRN
Qty: 1 G | Refills: 0 | Status: SHIPPED | OUTPATIENT
Start: 2020-08-21 | End: 2022-04-27 | Stop reason: SDUPTHER

## 2020-08-21 NOTE — TELEPHONE ENCOUNTER
PT HAS CALLED HER ALLERGIST 3 TIMES TODAY TO GET AN EMERGENCY INHALER AND THEY KEEP PUTTING HER OFF. THEY ALSO ARE NOT TAKING SAME DAY APPOINTMENTS SO THAT SHE CAN GET AN EMERGENCY BREATHING TREATMENT. PT IS WONDERING IF DR. ECHOLS WILL PRESCRIBE HER ONE UNTIL SHE CAN GET IN WITH HER ALLERGIST.     ALBUTERAL 90MCG     [facility]/pharmacy #[id number] - [place], [place] - [address] [phone] [facility] [phone] FX

## 2020-12-22 ENCOUNTER — OFFICE VISIT (OUTPATIENT)
Dept: FAMILY MEDICINE CLINIC | Facility: CLINIC | Age: 25
End: 2020-12-22

## 2020-12-22 VITALS
BODY MASS INDEX: 20.83 KG/M2 | SYSTOLIC BLOOD PRESSURE: 118 MMHG | WEIGHT: 122 LBS | HEIGHT: 64 IN | DIASTOLIC BLOOD PRESSURE: 72 MMHG | HEART RATE: 81 BPM | OXYGEN SATURATION: 99 %

## 2020-12-22 DIAGNOSIS — R42 VERTIGO: Primary | ICD-10-CM

## 2020-12-22 PROCEDURE — 99214 OFFICE O/P EST MOD 30 MIN: CPT | Performed by: FAMILY MEDICINE

## 2020-12-22 RX ORDER — MECLIZINE HYDROCHLORIDE 25 MG/1
25 TABLET ORAL 3 TIMES DAILY PRN
Qty: 20 TABLET | Refills: 0 | Status: SHIPPED | OUTPATIENT
Start: 2020-12-22 | End: 2022-05-31

## 2020-12-22 NOTE — PROGRESS NOTES
Subjective   Nely Schwarz is a 25 y.o. female who is here for   Chief Complaint   Patient presents with   • Dizziness     since yesterday    • Abdominal Pain     rt side of stomach - sharp pain when feels it    .     History of Present Illness   Nely comes in today with 2 issues.  First when she was at the gym exercising yesterday she had just spent some time on the elliptical rider.  When she got off and was bending over she got lightheaded and felt the room spinning around.  She went over to the mat and sat down and laid back.  Room spine for about 10 minutes.  It finally got better.  And she got up and one on home.  Since that time she had some more vertigo spells but not as severe as the first 1.  No recent sinus infection no headaches no ringing in the ears.  No recent head injury.  But she had did have a concussion a year ago.  Last normal period was 1 month ago she is now on OCP.      Also now having right abdominal wall pain.  She has been playing golf and also doing rockclimbing as well as working out in the gym.  And she thinks she may have strained her abdominal muscle.    The following portions of the patient's history were reviewed and updated as appropriate: allergies, current medications, past family history, past medical history, past social history, past surgical history and problem list.    Review of Systems    Objective   Physical Exam  Vitals signs reviewed.   HENT:      Right Ear: Tympanic membrane, ear canal and external ear normal.      Left Ear: Tympanic membrane, ear canal and external ear normal.   Neck:      Musculoskeletal: No neck rigidity or muscular tenderness.      Vascular: No carotid bruit.   Cardiovascular:      Rate and Rhythm: Normal rate.   Pulmonary:      Effort: Pulmonary effort is normal.   Abdominal:      General: Abdomen is flat. Bowel sounds are normal. There is no distension. There are no signs of injury.      Palpations: Abdomen is soft. There is no mass.       Tenderness: There is abdominal tenderness in the right lower quadrant. There is no right CVA tenderness, left CVA tenderness or guarding.      Hernia: No hernia is present.       Neurological:      Mental Status: She is alert.         Assessment/Plan   Diagnoses and all orders for this visit:    1. Vertigo (Primary)  -     meclizine (ANTIVERT) 25 MG tablet; Take 1 tablet by mouth 3 (Three) Times a Day As Needed for Dizziness.  Dispense: 20 tablet; Refill: 0      2.  Abdominal wall strain.        There are no Patient Instructions on file for this visit.    Medications Discontinued During This Encounter   Medication Reason   • amoxicillin (AMOXIL) 250 MG/5ML suspension *Therapy completed        Return if symptoms worsen or fail to improve.    Dr. Renato Reinoso  [facility] [place], [place]

## 2021-01-18 ENCOUNTER — IMMUNIZATION (OUTPATIENT)
Dept: VACCINE CLINIC | Facility: HOSPITAL | Age: 26
End: 2021-01-18

## 2021-01-18 PROCEDURE — 0001A: CPT | Performed by: THORACIC SURGERY (CARDIOTHORACIC VASCULAR SURGERY)

## 2021-01-18 PROCEDURE — 91300 HC SARSCOV02 VAC 30MCG/0.3ML IM: CPT | Performed by: THORACIC SURGERY (CARDIOTHORACIC VASCULAR SURGERY)

## 2021-02-08 ENCOUNTER — TELEPHONE (OUTPATIENT)
Dept: FAMILY MEDICINE CLINIC | Facility: CLINIC | Age: 26
End: 2021-02-08

## 2021-02-08 ENCOUNTER — IMMUNIZATION (OUTPATIENT)
Dept: VACCINE CLINIC | Facility: HOSPITAL | Age: 26
End: 2021-02-08

## 2021-02-08 DIAGNOSIS — R42 DIZZY SPELLS: Primary | ICD-10-CM

## 2021-02-08 PROCEDURE — 91300 HC SARSCOV02 VAC 30MCG/0.3ML IM: CPT | Performed by: THORACIC SURGERY (CARDIOTHORACIC VASCULAR SURGERY)

## 2021-02-08 PROCEDURE — 0002A: CPT | Performed by: THORACIC SURGERY (CARDIOTHORACIC VASCULAR SURGERY)

## 2021-02-08 NOTE — TELEPHONE ENCOUNTER
PATIENT STATES SHE IS STILL FEELING DIZZY AND WANTED TO SEE IF SHE CAN GET A BLOOD TEST.    CALL BACK: [phone]

## 2021-05-28 ENCOUNTER — OFFICE VISIT (OUTPATIENT)
Dept: FAMILY MEDICINE CLINIC | Facility: CLINIC | Age: 26
End: 2021-05-28

## 2021-05-28 ENCOUNTER — HOSPITAL ENCOUNTER (OUTPATIENT)
Dept: GENERAL RADIOLOGY | Facility: HOSPITAL | Age: 26
Discharge: HOME | End: 2021-05-28
Admitting: FAMILY MEDICINE

## 2021-05-28 VITALS
WEIGHT: 115 LBS | OXYGEN SATURATION: 98 % | TEMPERATURE: 98.2 F | BODY MASS INDEX: 19.63 KG/M2 | HEIGHT: 64 IN | DIASTOLIC BLOOD PRESSURE: 70 MMHG | HEART RATE: 83 BPM | SYSTOLIC BLOOD PRESSURE: 118 MMHG

## 2021-05-28 DIAGNOSIS — G89.29 CHRONIC RIGHT SHOULDER PAIN: ICD-10-CM

## 2021-05-28 DIAGNOSIS — M25.511 CHRONIC RIGHT SHOULDER PAIN: Primary | ICD-10-CM

## 2021-05-28 DIAGNOSIS — G89.29 CHRONIC RIGHT SHOULDER PAIN: Primary | ICD-10-CM

## 2021-05-28 DIAGNOSIS — M25.511 CHRONIC RIGHT SHOULDER PAIN: ICD-10-CM

## 2021-05-28 PROCEDURE — 73030 X-RAY EXAM OF SHOULDER: CPT

## 2021-05-28 PROCEDURE — 99213 OFFICE O/P EST LOW 20 MIN: CPT | Performed by: FAMILY MEDICINE

## 2021-05-28 NOTE — PROGRESS NOTES
"  Subjective   Nely Schwarz is a 25 y.o. female who is here for   Chief Complaint   Patient presents with   • Shoulder Pain     right shoulder- rock climbing 4 years, recently started golfing    .     History of Present Illness   Nely comes in her right shoulder is hurting again.  She is at off and on sports related injuries to the shoulder over the years.  She is currently a rock climber and also playing golf several days a week.  She feels grinding in the right shoulder.  Pain is mostly anterior.  Left shoulder is fine      The following portions of the patient's history were reviewed and updated as appropriate: allergies, current medications, past family history, past medical history, past social history, past surgical history and problem list.    Review of Systems    Objective   Vitals:    05/28/21 1544   BP: 118/70   BP Location: Left arm   Patient Position: Sitting   Cuff Size: Adult   Pulse: 83   Temp: 98.2 °F (36.8 °C)   TempSrc: Temporal   SpO2: 98%   Weight: 52.2 kg (115 lb)   Height: 162.6 cm (64\")      Physical Exam  Musculoskeletal:      Right shoulder: Tenderness, bony tenderness and crepitus present. No swelling. Decreased range of motion. Normal strength. Normal pulse.   Neurological:      Mental Status: She is alert.         Assessment/Plan   Diagnoses and all orders for this visit:    1. Chronic right shoulder pain (Primary)  -     XR Shoulder 2+ View Right; Future  -     Ambulatory Referral to Physical Therapy Evaluate and treat    She would like to enroll in physical therapy again so she can get back to her sports quickly.  She is in the same [facility] we will send her to the [facility]  There are no Patient Instructions on file for this visit.    There are no discontinued medications.     No follow-ups on file.    Dr. Renato Reinoso  [facility]  [place], [place]    "

## 2021-06-07 ENCOUNTER — HOSPITAL ENCOUNTER (OUTPATIENT)
Dept: PHYSICAL THERAPY | Facility: HOSPITAL | Age: 26
Setting detail: THERAPIES SERIES
Discharge: HOME | End: 2021-06-07

## 2021-06-07 DIAGNOSIS — M25.511 CHRONIC RIGHT SHOULDER PAIN: Primary | ICD-10-CM

## 2021-06-07 DIAGNOSIS — G89.29 CHRONIC RIGHT SHOULDER PAIN: Primary | ICD-10-CM

## 2021-06-07 DIAGNOSIS — M75.41 SHOULDER IMPINGEMENT, RIGHT: ICD-10-CM

## 2021-06-07 PROCEDURE — 97161 PT EVAL LOW COMPLEX 20 MIN: CPT

## 2021-06-07 PROCEDURE — 97110 THERAPEUTIC EXERCISES: CPT
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
## 2021-06-09 ENCOUNTER — HOSPITAL ENCOUNTER (OUTPATIENT)
Dept: PHYSICAL THERAPY | Facility: HOSPITAL | Age: 26
Setting detail: THERAPIES SERIES
Discharge: HOME | End: 2021-06-09

## 2021-06-09 PROCEDURE — 97110 THERAPEUTIC EXERCISES: CPT

## 2021-06-09 PROCEDURE — 97140 MANUAL THERAPY 1/> REGIONS: CPT
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
## 2021-06-15 ENCOUNTER — HOSPITAL ENCOUNTER (OUTPATIENT)
Dept: PHYSICAL THERAPY | Facility: HOSPITAL | Age: 26
Setting detail: THERAPIES SERIES
Discharge: HOME | End: 2021-06-15

## 2021-06-15 DIAGNOSIS — M25.511 CHRONIC RIGHT SHOULDER PAIN: Primary | ICD-10-CM

## 2021-06-15 DIAGNOSIS — M75.41 SHOULDER IMPINGEMENT, RIGHT: ICD-10-CM

## 2021-06-15 DIAGNOSIS — M25.562 ACUTE PAIN OF LEFT KNEE: ICD-10-CM

## 2021-06-15 DIAGNOSIS — G89.29 CHRONIC RIGHT SHOULDER PAIN: Primary | ICD-10-CM

## 2021-06-15 PROCEDURE — 97110 THERAPEUTIC EXERCISES: CPT

## 2021-06-15 PROCEDURE — 97140 MANUAL THERAPY 1/> REGIONS: CPT

## 2021-06-15 NOTE — THERAPY TREATMENT NOTE
Outpatient Physical Therapy Ortho Treatment Note  [facility]     Patient Name: Nely Schwarz  : 1995  MRN: [id number]  Today's Date: 6/15/2021      Visit Date: 06/15/2021    Visit Dx:    ICD-10-CM ICD-9-CM   1. Chronic right shoulder pain  M25.511 719.41    G89.29 338.29   2. Shoulder impingement, right  M75.41 726.2   3. Acute pain of left knee  M25.562 719.46       Patient Active Problem List   Diagnosis   • Atopic rhinitis   • Anxiety   • Headache   • Intermittent palpitations   • Arthralgia of right acromioclavicular joint   • Ovarian cyst   • History of traumatic head injury   • Vertigo   • Chronic right shoulder pain        Past Medical History:   Diagnosis Date   • Abdominal muscle strain    • Anxiety    • Costochondritis    • Injury of neck    • Pleurisy    • Stiff neck    • Stye    • Upper respiratory infection         Past Surgical History:   Procedure Laterality Date   • TOOTH EXTRACTION                         PT Assessment/Plan     Row Name 06/15/21 1030          PT Assessment    Assessment Comments  Ms. Schwarz returns to clinic, no noticeable improvement to date, however, pt. notes she has not been performing the aggravating activities including golfing so is not sure if she has made much progress. Pt. tolerated session well, reports tenderness to palpation in pec minor/lat region. Pt. demonstrates improved awareness to scapular retraction prior to performing ther ex. Added supine serratus punches and horizontal abduction to address scapular strength and stability. Pt. will benefit from continued skilled PT.  -        PT Plan    PT Plan Comments  emphasis on scapular/shoulder stability; consider RS flexion/ER/IR, serratus foam roll up wall  -       User Key  (r) = Recorded By, (t) = Taken By, (c) = Cosigned By    Initials Name Provider Type     Olya Hare, PT Physical Therapist            OP Exercises     Row Name 06/15/21 0900             Subjective Comments    Subjective  Comments  I don't know if its helping because I haven't really pushed myself, but I am working on my posture and stuff  -MH         Total Minutes    88083 - PT Therapeutic Exercise Minutes  30  -MH      38621 - PT Manual Therapy Minutes  12  -MH         Exercise 1    Exercise Name 1  Rows RTB  -MH      Cueing 1  Verbal;Demo  -MH      Sets 1  2  -MH      Reps 1  10  -MH         Exercise 2    Exercise Name 2  Seated Thoracic extensions  -MH      Cueing 2  --  -MH      Sets 2  --  -MH      Reps 2  --  -MH      Additional Comments  @ home  -MH         Exercise 3    Exercise Name 3  Standing doorway stretch  -MH      Cueing 3  Verbal;Demo  -MH      Reps 3  3  -MH      Time 3  30 sec  -MH         Exercise 4    Exercise Name 4  Open Books  -MH      Cueing 4  Verbal;Tactile  -MH      Sets 4  2  -MH      Reps 4  10  -MH         Exercise 5    Exercise Name 5  SL sleeper stretch  -MH      Cueing 5  Verbal;Tactile  -MH      Sets 5  --  -MH      Reps 5  3  -MH      Time 5  30 sec  -MH         Exercise 6    Exercise Name 6  standing around the world ball toss  -MH      Cueing 6  Verbal;Demo  -MH      Sets 6  2  -MH      Reps 6  10  -MH         Exercise 7    Exercise Name 7  Standing IR/ER  -MH      Cueing 7  Verbal;Tactile;Demo  -MH      Sets 7  2  -MH      Reps 7  10  -MH         Exercise 8    Exercise Name 8  SEER  -MH      Cueing 8  Verbal;Tactile;Demo  -MH      Sets 8  2  -MH      Reps 8  10  -MH         Exercise 9    Exercise Name 9  supine H-A  -MH      Cueing 9  Verbal  -MH      Sets 9  2  -MH      Reps 9  10  -MH      Time 9  feel scap squeeze  -MH      Additional Comments  RTB  -MH        User Key  (r) = Recorded By, (t) = Taken By, (c) = Cosigned By    Initials Name Provider Type    Olya Clark, PT Physical Therapist                      Manual Rx (last 36 hours)      Manual Treatments     Row Name 06/15/21 0900             Total Minutes    37675 - PT Manual Therapy Minutes  12  -MH         Manual Rx 1    Manual  Rx 1 Location  T3-7  -      Manual Rx 1 Type  R Unilateral and bilateral P/A  -      Manual Rx 1 Grade  Grade III-IV  -      Manual Rx 1 Duration  small cavitation T4  -         Manual Rx 2    Manual Rx 2 Location  SL scapular rhythm  -         Manual Rx 3    Manual Rx 3 Location  Supine lat STM with passive shoulder flexion  -         Manual Rx 4    Manual Rx 4 Location  Supine pec major STM with passive   -         Manual Rx 5    Manual Rx 5 Location  gentle oscillations   -        User Key  (r) = Recorded By, (t) = Taken By, (c) = Cosigned By    Initials Name Provider Type     Oyla Hare, PT Physical Therapist          PT OP Goals     Row Name 06/15/21 0900          PT Short Term Goals    STG Date to Achieve  07/07/21  -     STG 1  Pt will be independent and verbalized good understanding of initial HEP to improve ability to manage pain, as well as improve strength and ROM  -     STG 1 Progress  Met  -     STG 2  Pt will improve AROM shoulder IR to at least 45 deg with proper GH joint mechanics to improve ability to reach behind back.   -     STG 2 Progress  Ongoing  -        Long Term Goals    LTG Date to Achieve  08/06/21  -     LTG 1  Pt will be independent and verbalized good understanding of advanced HEP to improve ability to manage pain, as well as improve strength and ROM.  -     LTG 1 Progress  Ongoing  -     LTG 2  Pt will report 0/10 pain in R shoulder with overhead activities to improve participation in ADLs and recreational activities.   -     LTG 2 Progress  Ongoing  [facility]     LTG 3  Pt will report 0 sleep disturbances related to shoulder pain to improve overall quality of life.  -     LTG 3 Progress  Ongoing  [facility]     LTG 4  Pt will report playing a full round of golf with </= 2/10 pain to show improved pain levels to return to PLOF.  -     LTG 4 Progress  Ongoing  [facility]       User Key  (r) = Recorded By, (t) = Taken By, (c) = Cosigned By    Initials Name  Provider Type     Olya Hare PT Physical Therapist          Therapy Education  Education Details: Updated HEP Access Code: [email or web address]  Given: HEP  Program: Reinforced, Progressed  How Provided: Verbal, Demonstration, Other (comment) (emailed copy)  Provided to: Patient  Level of Understanding: Verbalized, Demonstrated              Time Calculation:   Start Time: 1002  Stop Time: 1045  Time Calculation (min): 43 min  Total Timed Code Minutes- PT: 42 minute(s)  Timed Charges  54970 - PT Therapeutic Exercise Minutes: 30  25910 - PT Manual Therapy Minutes: 12  Total Minutes  Timed Charges Total Minutes: 42   Total Minutes: 42  Therapy Charges for Today     Code Description Service Date Service Provider Modifiers Qty    42987068135 HC PT MANUAL THERAPY EA 15 MIN 6/15/2021 Olya Hare, PT GP 1    57733803948 HC PT THER PROC EA 15 MIN 6/15/2021 Olya Hare, PT GP 2                    Olya Hare PT  6/15/2021

## 2021-06-24 ENCOUNTER — HOSPITAL ENCOUNTER (OUTPATIENT)
Dept: PHYSICAL THERAPY | Facility: HOSPITAL | Age: 26
Setting detail: THERAPIES SERIES
Discharge: HOME | End: 2021-06-24

## 2021-06-24 DIAGNOSIS — M25.562 ACUTE PAIN OF LEFT KNEE: ICD-10-CM

## 2021-06-24 DIAGNOSIS — G89.29 CHRONIC RIGHT SHOULDER PAIN: Primary | ICD-10-CM

## 2021-06-24 DIAGNOSIS — M25.511 CHRONIC RIGHT SHOULDER PAIN: Primary | ICD-10-CM

## 2021-06-24 DIAGNOSIS — M75.41 SHOULDER IMPINGEMENT, RIGHT: ICD-10-CM

## 2021-06-24 PROCEDURE — 97110 THERAPEUTIC EXERCISES: CPT

## 2021-06-24 NOTE — THERAPY TREATMENT NOTE
Outpatient Physical Therapy Ortho Treatment Note  [facility]     Patient Name: Nely Schwarz  : 1995  MRN: [id number]  Today's Date: 2021      Visit Date: 2021    Visit Dx:    ICD-10-CM ICD-9-CM   1. Chronic right shoulder pain  M25.511 719.41    G89.29 338.29   2. Shoulder impingement, right  M75.41 726.2   3. Acute pain of left knee  M25.562 719.46       Patient Active Problem List   Diagnosis   • Atopic rhinitis   • Anxiety   • Headache   • Intermittent palpitations   • Arthralgia of right acromioclavicular joint   • Ovarian cyst   • History of traumatic head injury   • Vertigo   • Chronic right shoulder pain        Past Medical History:   Diagnosis Date   • Abdominal muscle strain    • Anxiety    • Costochondritis    • Injury of neck    • Pleurisy    • Stiff neck    • Stye    • Upper respiratory infection         Past Surgical History:   Procedure Laterality Date   • TOOTH EXTRACTION                         PT Assessment/Plan     Row Name 21 1326          PT Assessment    Assessment Comments  Ms. Schwarz returns for her 4th visit for R shoulder pain.  AROM is WNL's but she still has some tightness present at anterior capsule, and c/o discomfort with certain overhead and behind the back motion.  She is progressing well with HEP, and demonstrating increased awareness of her posture, both in standing and sitting.  -LP     Please refer to paper survey for additional self-reported information  Yes  -LP     Rehab Potential  Good  -LP     Patient/caregiver participated in establishment of treatment plan and goals  Yes  -LP     Patient would benefit from skilled therapy intervention  Yes  -LP        PT Plan    PT Frequency  2x/week  -LP     Predicted Duration of Therapy Intervention (PT)  2-3 weeks  -LP     PT Plan Comments  Continue to progress HEP with increasing resistance and ranges as pt tolerates.  -LP       User Key  (r) = Recorded By, (t) = Taken By, (c) = Cosigned By    Initials  Name Provider Type    LP Maryellen Jefferson PT Physical Therapist            OP Exercises     Row Name 06/24/21 1000             Subjective Comments    Subjective Comments  No new complaints  -LP         Subjective Pain    Able to rate subjective pain?  yes  -LP      Pre-Treatment Pain Level  0  -LP         Total Minutes    16985 - PT Therapeutic Exercise Minutes  42  -LP         Exercise 1    Exercise Name 1  Rows RTB  -LP      Cueing 1  Verbal;Demo  -LP      Sets 1  2  -LP      Reps 1  10  -LP         Exercise 2    Exercise Name 2  Seated Thoracic extensions  -LP      Cueing 2  Verbal;Demo  -LP      Sets 2  1  -LP      Reps 2  5  -LP      Time 2  10-15s  -LP         Exercise 3    Exercise Name 3  Standing doorway stretch  -LP      Cueing 3  Verbal;Demo  -LP      Reps 3  3  -LP      Time 3  30 sec ea  -LP      Additional Comments  up high and mid range  -LP         Exercise 4    Exercise Name 4  Open Book  -LP      Cueing 4  Verbal;Tactile  -LP      Sets 4  2  -LP      Reps 4  10  -LP      Time 4  5s  -LP         Exercise 5    Exercise Name 5  SL sleeper stretch  -LP      Cueing 5  Verbal;Tactile  -LP      Reps 5  3  -LP      Time 5  30  -LP         Exercise 6    Exercise Name 6  standing around the world ball toss  -LP      Cueing 6  Verbal;Demo  -LP      Sets 6  2  -LP      Reps 6  10  -LP      Additional Comments  CW/CCW-maintained good recruitment of scap. mm  -LP         Exercise 7    Exercise Name 7  Standing IR/ER  -LP      Cueing 7  Verbal;Tactile;Demo  -LP      Sets 7  2  -LP      Reps 7  10  -LP      Additional Comments  RTB, with towel  -LP         Exercise 8    Exercise Name 8  SEER  -LP      Cueing 8  Verbal;Tactile;Demo  -LP      Sets 8  2  -LP      Reps 8  10  -LP      Additional Comments  RTB  -LP         Exercise 9    Exercise Name 9  supine H-A  -LP      Cueing 9  Verbal  -LP      Sets 9  2  -LP      Reps 9  10  -LP      Additional Comments  RTB  -LP         Exercise 10    Exercise Name 10   standing B ER with RTB  -LP      Cueing 10  Verbal;Tactile;Demo  -LP      Sets 10  2  -LP      Reps 10  10  -LP      Additional Comments  RTB  -LP         Exercise 11    Exercise Name 11  UBE  -LP      Cueing 11  Verbal;Tactile;Demo  -LP      Time 11  4 min L2  -LP      Additional Comments  1 min BW  -LP         Exercise 12    Exercise Name 12  supine D2 pattern  -LP      Cueing 12  Verbal;Tactile;Demo  -LP      Reps 12  2  -LP      Time 12  10  -LP      Additional Comments  one set with YTB  -LP         Exercise 13    Exercise Name 13  prone I's  -LP      Cueing 13  Verbal;Tactile;Demo  -LP      Sets 13  2  -LP      Reps 13  10  -LP      Additional Comments  no weight  -LP        User Key  (r) = Recorded By, (t) = Taken By, (c) = Cosigned By    Initials Name Provider Type    Maryellen Saldivar, PT Physical Therapist                       PT OP Goals     Row Name 06/24/21 1300          PT Short Term Goals    STG 1  Pt will be independent and verbalized good understanding of initial HEP to improve ability to manage pain, as well as improve strength and ROM  -LP     STG 1 Progress  Met  -LP     STG 2  Pt will improve AROM shoulder IR to at least 45 deg with proper GH joint mechanics to improve ability to reach behind back.   -LP     STG 2 Progress  Ongoing  -LP     STG 2 Progress Comments  did not measure today, but getting close to goal  -LP        Long Term Goals    LTG 1  Pt will be independent and verbalized good understanding of advanced HEP to improve ability to manage pain, as well as improve strength and ROM.  -LP     LTG 1 Progress  Progressing  -LP     LTG 1 Progress Comments  pt demonstrates good understanding of exercises and mechanics of her shoulder, including precautions.  -LP     LTG 2  Pt will report 0/10 pain in R shoulder with overhead activities to improve participation in ADLs and recreational activities.   -LP     LTG 2 Progress  Progressing  -LP     LTG 3  Pt will report 0 sleep disturbances  related to shoulder pain to improve overall quality of life.  -LP     LTG 3 Progress  Ongoing  -LP     LTG 4  Pt will report playing a full round of golf with </= 2/10 pain to show improved pain levels to return to PLOF.  -LP     LTG 4 Progress  Progressing  -LP     LTG 4 Progress Comments  pt did play some golf, but not 18 holes, did feel sore after.  -LP       User Key  (r) = Recorded By, (t) = Taken By, (c) = Cosigned By    Initials Name Provider Type    LP Maryellen Jefferson, PT Physical Therapist          Therapy Education  Given: HEP, Symptoms/condition management, Pain management, Posture/body mechanics  Program: New, Reinforced  How Provided: Verbal, Demonstration  Provided to: Patient  Level of Understanding: Teach back education performed              Time Calculation:   Start Time: 1000  Stop Time: 1045  Time Calculation (min): 45 min  Timed Charges  26163 - PT Therapeutic Exercise Minutes: 42  Total Minutes  Timed Charges Total Minutes: 42   Total Minutes: 42  Therapy Charges for Today     Code Description Service Date Service Provider Modifiers Qty    [id number]  PT THER PROC EA 15 MIN 6/24/2021 Maryellen Jefferson, PT GP 3                    Maryellen Jefferson PT  6/24/2021

## 2021-08-25 ENCOUNTER — TELEMEDICINE (OUTPATIENT)
Dept: FAMILY MEDICINE CLINIC | Facility: CLINIC | Age: 26
End: 2021-08-25

## 2021-08-25 DIAGNOSIS — F41.0 PANIC ATTACK: Primary | ICD-10-CM

## 2021-08-25 PROCEDURE — 99213 OFFICE O/P EST LOW 20 MIN: CPT | Performed by: FAMILY MEDICINE

## 2021-08-25 RX ORDER — DIAZEPAM 5 MG/1
5 TABLET ORAL 2 TIMES DAILY PRN
Qty: 20 TABLET | Refills: 0 | Status: SHIPPED | OUTPATIENT
Start: 2021-08-25 | End: 2022-04-27

## 2021-08-25 NOTE — PROGRESS NOTES
Subjective   Nely Schwarz is a 26 y.o. female who is here for   Chief Complaint   Patient presents with   • Anxiety   .     History of Present Illness   This is a Mychart Video medical encounter, occurring during the coronavirus COVID-19 pandemic of 2020.  Medical history from chart is reviewed. Audio visual encounter provided through a secure link via Zoom. Patient location is per their choice. Provider location is in my routine medical office in [facility]. Patient has given prior consent for this encounter, via check in process. Regarding EM coding: history will not be as robust and exam will be limited. Most EM coding decisions will be based on MDM and time.  Total face video time, 15 minutes . Total chart time pre and post chartin-20 minutes.    Video visit with Nely today.  Long history of generalized anxiety disorder.  She manage that through exercise and CBD gummy bears.    But she occasionally has panic attacks usually when a very stressful situation with family friends were comes up.  Or when she has to get on an airplane which she has to do in a few weeks.  She is flying to [facility] to see her parents.        The following portions of the patient's history were reviewed and updated as appropriate: allergies, current medications, past family history, past medical history, past social history, past surgical history and problem list.    Review of Systems    Objective   There were no vitals filed for this visit.   Physical Exam  Neurological:      Mental Status: She is alert.   Psychiatric:         Mood and Affect: Mood normal.         Assessment/Plan   Diagnoses and all orders for this visit:    1. Panic attack (Primary)  -     diazePAM (Valium) 5 MG tablet; Take 1 tablet by mouth 2 (Two) Times a Day As Needed for Anxiety.  Dispense: 20 tablet; Refill: 0      There are no Patient Instructions on file for this visit.    Medications Discontinued During This Encounter   Medication Reason    • busPIRone (BUSPAR) 15 MG tablet *Therapy completed   • CRYSELLE-28 0.3-30 MG-MCG per tablet Discontinued by another clinician        Return if symptoms worsen or fail to improve.    Dr. Renato Reinoso  [place], [place]

## 2021-11-23 ENCOUNTER — DOCUMENTATION (OUTPATIENT)
Dept: PHYSICAL THERAPY | Facility: HOSPITAL | Age: 26
End: 2021-11-23

## 2021-11-23 DIAGNOSIS — M25.511 CHRONIC RIGHT SHOULDER PAIN: Primary | ICD-10-CM

## 2021-11-23 DIAGNOSIS — G89.29 CHRONIC RIGHT SHOULDER PAIN: Primary | ICD-10-CM

## 2021-11-23 DIAGNOSIS — M25.562 ACUTE PAIN OF LEFT KNEE: ICD-10-CM

## 2021-11-23 DIAGNOSIS — M75.41 SHOULDER IMPINGEMENT, RIGHT: ICD-10-CM

## 2021-11-23 NOTE — THERAPY DISCHARGE NOTE
Outpatient Physical Therapy Discharge Summary         Patient Name: Nely Schwarz  : 1995  MRN: [id number]    Today's Date: 2021    Visit Dx:    ICD-10-CM ICD-9-CM   1. Chronic right shoulder pain  M25.511 719.41    G89.29 338.29   2. Shoulder impingement, right  M75.41 726.2   3. Acute pain of left knee  M25.562 719.46        PT OP Goals     Row Name 21 1200          PT Short Term Goals    STG 1 Pt will be independent and verbalized good understanding of initial HEP to improve ability to manage pain, as well as improve strength and ROM  -EDUARDO     STG 1 Progress Met  -EDUARDO     STG 2 Pt will improve AROM shoulder IR to at least 45 deg with proper GH joint mechanics to improve ability to reach behind back.   -EDUARDO     STG 2 Progress Not Met  -EDUARDO            Long Term Goals    LTG 1 Pt will be independent and verbalized good understanding of advanced HEP to improve ability to manage pain, as well as improve strength and ROM.  -EDUARDO     LTG 1 Progress Not Met  -EDUARDO     LTG 2 Pt will report 0/10 pain in R shoulder with overhead activities to improve participation in ADLs and recreational activities.   -EDUARDO     LTG 2 Progress Not Met  -EDUARDO     LTG 3 Pt will report 0 sleep disturbances related to shoulder pain to improve overall quality of life.  -EDUARDO     LTG 3 Progress Not Met  -EDUARDO     LTG 4 Pt will report playing a full round of golf with </= 2/10 pain to show improved pain levels to return to PLOF.  -EDUARDO     LTG 4 Progress Not Met  -EDUARDO           User Key  (r) = Recorded By, (t) = Taken By, (c) = Cosigned By    Initials Name Provider Type    Lydia Jaime, PT Physical Therapist                OP PT Discharge Summary  Reason for Discharge: Non-compliant  Outcomes Achieved: Patient able to partially acheive established goals  Discharge Destination: Home with home program  Discharge Instructions/Additional Comments: Nely Schwarz was seen for 3 physical therapy sessions including initial evaluation  for R shoulder impingement. Patient unable to meet all goals due to limited PT treatment. Patient being discharged due to non-compliance.      Time Calculation:                    Lydia Phillips, PT  11/23/2021

## 2022-01-17 ENCOUNTER — TELEPHONE (OUTPATIENT)
Dept: FAMILY MEDICINE CLINIC | Facility: CLINIC | Age: 27
End: 2022-01-17

## 2022-01-17 NOTE — TELEPHONE ENCOUNTER
Provider: DR SHERIFF    Caller: BYRON MENDEZ    Relationship to Patient: PATIENT    Pharmacy: [facility]/pharmacy #[id number] - [place], [place] - [address] [facility] - [phone] [facility] [phone]   [phone]    Phone Number: [phone]    Reason for Call: PATIENT IS SEEING A THERAPIST AND SHE HAS RECOMMENDED SHE TAKE A LOW DOSE OF ABILIFY/ARIPIPRAZOLE.  THE THERAPIST IS UNABLE TO PRESCRIBE MEDICATIONS AND SO THE PATIENT WOULD LIKE TO KNOW IF THIS IS SOMETHING THAT DR SHERIFF CAN HELP HER WITH.      PLEASE CALL AND ADVISE

## 2022-01-19 ENCOUNTER — TELEMEDICINE (OUTPATIENT)
Dept: FAMILY MEDICINE CLINIC | Facility: CLINIC | Age: 27
End: 2022-01-19

## 2022-01-19 DIAGNOSIS — F41.9 ANXIETY: Primary | ICD-10-CM

## 2022-01-19 PROCEDURE — 99213 OFFICE O/P EST LOW 20 MIN: CPT | Performed by: FAMILY MEDICINE

## 2022-01-19 RX ORDER — ARIPIPRAZOLE 5 MG/1
5 TABLET ORAL DAILY
Qty: 30 TABLET | Refills: 0 | Status: SHIPPED | OUTPATIENT
Start: 2022-01-19 | End: 2022-03-08 | Stop reason: SINTOL

## 2022-01-19 NOTE — PROGRESS NOTES
Subjective   Nely Schwarz is a 26 y.o. female who is here for   Chief Complaint   Patient presents with   • Anxiety   .   PATIENT IS SEEING A THERAPIST AND SHE HAS RECOMMENDED SHE TAKE A LOW DOSE OF ABILIFY/ARIPIPRAZOLE.  THE THERAPIST IS UNABLE TO PRESCRIBE MEDICATIONS AND SO THE PATIENT WOULD LIKE TO KNOW IF THIS IS SOMETHING THAT DR SHERIFF CAN HELP HER WITH.       PLEASE CALL AND ADVISE  History of Present Illness   This is a [facility] Video medical encounter, occurring during the coronavirus COVID-19 pandemic of 2020.  Medical history from chart is reviewed. Audio visual encounter provided through a secure link via Zoom. Patient location is per their choice. Provider location is in my routine medical office in [facility]. Patient has given prior consent for this encounter, via check in process. Regarding EM coding: history will not be as robust and exam will be limited. Most EM coding decisions will be based on MDM and time.  Total face video time, 15 minutes . Total chart time pre and post chartin-20 minutes.    Nely connects today via [employer] video for anxiety.  She has had lifelong anxiety problems.  Due to the pandemic she is been working from home for 2 years.  She now landed a new job which required her to go into the office.  So she is having social anxiety about this.  She is seeing a therapist.  And the therapist recommended a small dose of Abilify to control her mood.      The following portions of the patient's history were reviewed and updated as appropriate: allergies, current medications, past family history, past medical history, past social history, past surgical history and problem list.    Review of Systems    Objective   There were no vitals filed for this visit.   Physical Exam  Pulmonary:      Effort: No respiratory distress.   Neurological:      Mental Status: She is alert.   Psychiatric:         Mood and Affect: Mood normal.         Assessment/Plan   Diagnoses and all orders  for this visit:    1. Anxiety (Primary)  -     ARIPiprazole (Abilify) 5 MG tablet; Take 1 tablet by mouth Daily.  Dispense: 30 tablet; Refill: 0      There are no Patient Instructions on file for this visit.    There are no discontinued medications.     Return in about 1 month (around 2/19/2022) for new medication follow up.    Dr. Renato Reinoso  [place], [place]

## 2022-02-15 DIAGNOSIS — F41.9 ANXIETY: ICD-10-CM

## 2022-02-15 RX ORDER — ARIPIPRAZOLE 5 MG/1
TABLET ORAL
Qty: 30 TABLET | Refills: 0 | OUTPATIENT
Start: 2022-02-15

## 2022-03-07 ENCOUNTER — TELEMEDICINE (OUTPATIENT)
Dept: FAMILY MEDICINE CLINIC | Facility: CLINIC | Age: 27
End: 2022-03-07

## 2022-03-07 DIAGNOSIS — F41.9 ANXIETY: Primary | ICD-10-CM

## 2022-03-07 NOTE — PROGRESS NOTES
Unable to connect via [employer] video.  The connection from the office PC to my smart phone through haiku was not functioning today.  We had staff give Nely a call, she is reschedule be seen in person office tomorrow      Renato Reinoso MD

## 2022-03-08 ENCOUNTER — OFFICE VISIT (OUTPATIENT)
Dept: FAMILY MEDICINE CLINIC | Facility: CLINIC | Age: 27
End: 2022-03-08

## 2022-03-08 VITALS
BODY MASS INDEX: 18.78 KG/M2 | TEMPERATURE: 96.8 F | SYSTOLIC BLOOD PRESSURE: 118 MMHG | DIASTOLIC BLOOD PRESSURE: 70 MMHG | HEART RATE: 105 BPM | WEIGHT: 110 LBS | OXYGEN SATURATION: 98 % | HEIGHT: 64 IN

## 2022-03-08 DIAGNOSIS — F41.9 ANXIETY: Primary | ICD-10-CM

## 2022-03-08 PROCEDURE — 99213 OFFICE O/P EST LOW 20 MIN: CPT | Performed by: FAMILY MEDICINE

## 2022-03-08 RX ORDER — ESCITALOPRAM OXALATE 5 MG/1
5 TABLET ORAL DAILY
Qty: 30 TABLET | Refills: 0 | Status: SHIPPED | OUTPATIENT
Start: 2022-03-08 | End: 2022-03-30 | Stop reason: SDUPTHER

## 2022-03-08 NOTE — PROGRESS NOTES
"  Subjective   Nely Schwarz is a 26 y.o. female who is here for   Chief Complaint   Patient presents with   • Weight Loss     No loss of appetite    • Anxiety   • Dizziness   .     History of Present Illness   F/u on Abilify.  Starrs anxiety is not well controlled.  Has anxiety on most days.  Also having panic attacks.  Sleeping fairly well.  She does have a supportive new boyfriend.  Does have a nice new job.  She works from home on some days.  Very time she gets out in the public she starts having panic attacks.  Is having a mild panic attack here in the office today.  She took 1 Abilify, reported made her feel worse and nauseous so she did not take it anymore.  Not sure if the true side effect of Abilify or just another panic attack.      The following portions of the patient's history were reviewed and updated as appropriate: allergies, current medications, past family history, past medical history, past social history, past surgical history and problem list.    Review of Systems    Objective   Vitals:    03/08/22 1259   BP: 118/70   BP Location: Left arm   Patient Position: Sitting   Cuff Size: Adult   Pulse: 105   Temp: 96.8 °F (36 °C)   SpO2: 98%   Weight: 49.9 kg (110 lb)   Height: 162.6 cm (64\")      Physical Exam  Vitals reviewed.   Neurological:      Mental Status: She is alert.   Psychiatric:         Mood and Affect: Mood is anxious. Affect is tearful.         Assessment/Plan   Diagnoses and all orders for this visit:    1. Anxiety (Primary)  -     escitalopram (Lexapro) 5 MG tablet; Take 1 tablet by mouth Daily.  Dispense: 30 tablet; Refill: 0    So we will stop the Abilify.  We will try low-dose Lexapro.  We will see her back in 3 weeks    There are no Patient Instructions on file for this visit.    Medications Discontinued During This Encounter   Medication Reason   • ARIPiprazole (Abilify) 5 MG tablet Side effects        Return in about 3 weeks (around 3/29/2022) for new medication follow " up.    Dr. Renato Reinoso  [place], [place]

## 2022-03-30 ENCOUNTER — OFFICE VISIT (OUTPATIENT)
Dept: FAMILY MEDICINE CLINIC | Facility: CLINIC | Age: 27
End: 2022-03-30

## 2022-03-30 VITALS
HEIGHT: 64 IN | OXYGEN SATURATION: 100 % | TEMPERATURE: 97.5 F | DIASTOLIC BLOOD PRESSURE: 68 MMHG | HEART RATE: 90 BPM | WEIGHT: 108 LBS | SYSTOLIC BLOOD PRESSURE: 120 MMHG | BODY MASS INDEX: 18.44 KG/M2

## 2022-03-30 DIAGNOSIS — F41.9 ANXIETY: ICD-10-CM

## 2022-03-30 PROCEDURE — 99213 OFFICE O/P EST LOW 20 MIN: CPT | Performed by: FAMILY MEDICINE

## 2022-03-30 RX ORDER — ESCITALOPRAM OXALATE 5 MG/1
5 TABLET ORAL DAILY
Qty: 90 TABLET | Refills: 0 | Status: SHIPPED | OUTPATIENT
Start: 2022-03-30 | End: 2022-05-31 | Stop reason: SDUPTHER

## 2022-03-30 NOTE — PROGRESS NOTES
"  Subjective   Nely Schwarz is a 26 y.o. female who is here for   Chief Complaint   Patient presents with   • Anxiety     Lexapro f/u    • Weight Loss     Unable to gain weight- eating plenty    .     History of Present Illness     Nely comes back and is feeling much better.  Her mood is improved on the 5 mg of Lexapro.  No side effects.  She is able to go back to in person work without having a panic attack.  Sleeping better.  Has gone back to her yoga classes    The following portions of the patient's history were reviewed and updated as appropriate: allergies, current medications, past family history, past medical history, past social history, past surgical history and problem list.    Review of Systems    Objective   Vitals:    03/30/22 1400   BP: 120/68   BP Location: Left arm   Patient Position: Sitting   Cuff Size: Adult   Pulse: 90   Temp: 97.5 °F (36.4 °C)   SpO2: 100%   Weight: 49 kg (108 lb)   Height: 162.6 cm (64\")      Physical Exam  Vitals reviewed.   Cardiovascular:      Rate and Rhythm: Normal rate.   Pulmonary:      Effort: Pulmonary effort is normal.   Psychiatric:         Mood and Affect: Mood normal.         Assessment/Plan   Diagnoses and all orders for this visit:    1. Anxiety  -     escitalopram (Lexapro) 5 MG tablet; Take 1 tablet by mouth Daily.  Dispense: 90 tablet; Refill: 0      There are no Patient Instructions on file for this visit.    Medications Discontinued During This Encounter   Medication Reason   • escitalopram (Lexapro) 5 MG tablet Reorder        Return in about 3 months (around 6/30/2022) for new medication follow up.    Dr. Renato Reinoso  [place], [place]    "

## 2022-04-26 ENCOUNTER — TELEPHONE (OUTPATIENT)
Dept: FAMILY MEDICINE CLINIC | Facility: CLINIC | Age: 27
End: 2022-04-26

## 2022-04-26 NOTE — TELEPHONE ENCOUNTER
PATIENT IS CALLING IN SHE STATES SHE HAD COVID ABOUT 2 WEEKS AGO AND SHE IS STILL HAVING A LOT OF ISSUES.     LOT OF COUGHING, AND TIRED AND STILL FEELING BAD SHE FEELS LIKE SHE IS JUST NOT GETTING BETTER. SHE HAS BEEN MEDICATING WITH OTC MEDS DAYQUIL , ZYRTEC BUT SHE IS JUST NOT BETTER.    SHE WOULD LIKE TO KNOW WHAT DOCTOR THINKS SHE NEEDS TO DO AT THIS POINT.    PLEASE ADVISE    CALLBACK NUMBER IS  [phone]

## 2022-04-27 ENCOUNTER — TELEMEDICINE (OUTPATIENT)
Dept: FAMILY MEDICINE CLINIC | Facility: CLINIC | Age: 27
End: 2022-04-27

## 2022-04-27 DIAGNOSIS — U07.1 COVID-19 VIRUS INFECTION: Primary | ICD-10-CM

## 2022-04-27 PROCEDURE — 99214 OFFICE O/P EST MOD 30 MIN: CPT | Performed by: FAMILY MEDICINE

## 2022-04-27 RX ORDER — PREDNISONE 10 MG/1
10 TABLET ORAL 2 TIMES DAILY
Qty: 14 TABLET | Refills: 0 | Status: SHIPPED | OUTPATIENT
Start: 2022-04-27 | End: 2022-05-31

## 2022-04-27 RX ORDER — CETIRIZINE HYDROCHLORIDE 10 MG/1
10 TABLET ORAL DAILY
COMMUNITY

## 2022-04-27 RX ORDER — AZITHROMYCIN 250 MG/1
TABLET, FILM COATED ORAL
Qty: 6 TABLET | Refills: 0 | Status: SHIPPED | OUTPATIENT
Start: 2022-04-27 | End: 2022-05-31

## 2022-04-27 RX ORDER — ALBUTEROL SULFATE 90 UG/1
2 AEROSOL, METERED RESPIRATORY (INHALATION) EVERY 4 HOURS PRN
Qty: 1 G | Refills: 0 | Status: SHIPPED | OUTPATIENT
Start: 2022-04-27 | End: 2022-05-24

## 2022-04-27 NOTE — PROGRESS NOTES
Subjective   Nely Schwarz is a 26 y.o. female who is here for   Chief Complaint   Patient presents with   • Covid-19 Home Monitoring Video Visit   .   PATIENT IS CALLING IN SHE STATES SHE HAD COVID ABOUT 2 WEEKS AGO AND SHE IS STILL HAVING A LOT OF ISSUES.      LOT OF COUGHING, AND TIRED AND STILL FEELING BAD SHE FEELS LIKE SHE IS JUST NOT GETTING BETTER. SHE HAS BEEN MEDICATING WITH OTC MEDS DAYQUIL , ZYRTEC BUT SHE IS JUST NOT BETTER.     SHE WOULD LIKE TO KNOW WHAT DOCTOR THINKS SHE NEEDS TO DO AT THIS POINT.     PLEASE ADVISE     CALLBACK NUMBER IS  [phone]    History of Present Illness   This is a [employer] Video medical encounter, occurring during the coronavirus COVID-19 pandemic of 2020.  Medical history from chart is reviewed. Audio visual encounter provided through a secure link via Zoom. Patient location is per their choice. Provider location is in my routine medical office in [facility]. Patient has given prior consent for this encounter, via check in process. Regarding EM coding: history will not be as robust and exam will be limited. Most EM coding decisions will be based on MDM and time.  Total face video time, 15 minutes . Total chart time pre and post chartin-20 minutes.    Tayler contracted COVID-19 infection about 2 weeks ago.  Not sure what the source is.  And gave it to her boyfriend.  She is having lingering effects of fatigue.  Dyspnea on exertion.  No more fever.  Had congestion.  She is triple lax.        The following portions of the patient's history were reviewed and updated as appropriate: allergies, current medications, past family history, past medical history, past social history, past surgical history and problem list.    Review of Systems    Objective   There were no vitals filed for this visit.   Physical Exam  Pulmonary:      Effort: No respiratory distress.   Neurological:      Mental Status: She is alert.         Assessment/Plan   Diagnoses and all orders for this  visit:    1. COVID-19 virus infection (Primary)  -     albuterol sulfate  (90 Base) MCG/ACT inhaler; Inhale 2 puffs Every 4 (Four) Hours As Needed for Wheezing.  Dispense: 1 g; Refill: 0  -     azithromycin (Zithromax) 250 MG tablet; Take 2 tablets the first day, then 1 tablet daily for 4 days.  Dispense: 6 tablet; Refill: 0  -     predniSONE (DELTASONE) 10 MG tablet; Take 1 tablet by mouth 2 (Two) Times a Day.  Dispense: 14 tablet; Refill: 0      There are no Patient Instructions on file for this visit.    Medications Discontinued During This Encounter   Medication Reason   • diazePAM (Valium) 5 MG tablet *Therapy completed   • albuterol sulfate  (90 Base) MCG/ACT inhaler Reorder        Return if symptoms worsen or fail to improve.    Dr. Renato Reinoso  [place]

## 2022-05-24 DIAGNOSIS — U07.1 COVID-19 VIRUS INFECTION: ICD-10-CM

## 2022-05-24 RX ORDER — ALBUTEROL SULFATE 90 UG/1
AEROSOL, METERED RESPIRATORY (INHALATION)
Qty: 6.7 G | Refills: 0 | Status: SHIPPED | OUTPATIENT
Start: 2022-05-24

## 2022-05-31 ENCOUNTER — TELEMEDICINE (OUTPATIENT)
Dept: FAMILY MEDICINE CLINIC | Facility: CLINIC | Age: 27
End: 2022-05-31

## 2022-05-31 DIAGNOSIS — R53.82 CHRONIC FATIGUE: Primary | ICD-10-CM

## 2022-05-31 DIAGNOSIS — F41.9 ANXIETY: ICD-10-CM

## 2022-05-31 PROCEDURE — 99213 OFFICE O/P EST LOW 20 MIN: CPT | Performed by: FAMILY MEDICINE

## 2022-05-31 RX ORDER — ESCITALOPRAM OXALATE 5 MG/1
5 TABLET ORAL DAILY
Qty: 90 TABLET | Refills: 3 | Status: SHIPPED | OUTPATIENT
Start: 2022-05-31

## 2022-05-31 NOTE — PROGRESS NOTES
Subjective   Nely Schwarz is a 26 y.o. female who is here for No chief complaint on file.  .     History of Present Illness     This is a [employer] Video medical encounter, occurring during the coronavirus COVID-19 pandemic of 2020.  Medical history from chart is reviewed. Audio visual encounter provided through a secure link via Zoom. Patient location is per their choice. Provider location is in my routine medical office in [facility]. Patient has given prior consent for this encounter, via check in process. Regarding EM coding: history will not be as robust and exam will be limited. Most EM coding decisions will be based on MDM and time.  Total face video time, 15 minutes . Total chart time pre and post chartin-20 minutes.    Nely connects via [employer] video today generally feeling better.  Has gotten over her recent infection of COVID-19.  He is back to work.  Her boyfriend is also feeling better.  She feels her anxiety is well controlled with the 5 mg Lexapro so we will continue that    Still has low energy fatigue would like to stay in bed or take a nap.  Like to have her lab panels drawn.          The following portions of the patient's history were reviewed and updated as appropriate: allergies, current medications, past family history, past medical history, past social history, past surgical history and problem list.    Review of Systems    Objective   There were no vitals filed for this visit.   Physical Exam  Pulmonary:      Effort: No respiratory distress.   Neurological:      Mental Status: She is alert.   Psychiatric:         Mood and Affect: Mood normal.         Assessment & Plan   Diagnoses and all orders for this visit:    1. Chronic fatigue (Primary)  -     CBC (No Diff); Future  -     Comprehensive Metabolic Panel; Future  -     TSH; Future  -     Iron and TIBC; Future  -     Ferritin; Future    2. Anxiety  -     escitalopram (Lexapro) 5 MG tablet; Take 1 tablet by mouth Daily.   Dispense: 90 tablet; Refill: 3  -     CBC (No Diff); Future  -     Comprehensive Metabolic Panel; Future  -     TSH; Future  -     Iron and TIBC; Future  -     Ferritin; Future      There are no Patient Instructions on file for this visit.    Medications Discontinued During This Encounter   Medication Reason   • azithromycin (Zithromax) 250 MG tablet *Therapy completed   • predniSONE (DELTASONE) 10 MG tablet *Therapy completed   • meclizine (ANTIVERT) 25 MG tablet    • escitalopram (Lexapro) 5 MG tablet Reorder        Return in about 1 year (around 5/31/2023).    Dr. Renato Reinoso  [facility]  [place], [place]

## 2023-09-08 NOTE — PROGRESS NOTES
Patient calling requesting refills for hydrochlorothiazide and levothyroxine, states she is out of both as of this morning.     Levothyroxine refill already in place. Christian'd up refill for hydrochlorothiazide.     Maris Juan RN on 9/8/2023 at 2:22 PM      Subjective   Nely Schwarz is a 21 y.o. female.   Chief Complaint   Patient presents with   • Mass     has a lump on inside of right hip, noticed x 7 days ago     Vitals:    08/08/17 0842   BP: 98/56   Pulse: 83   Resp: 18   SpO2: 98%     No Known Allergies    HPI Comments: This is a new problem  Bump on right groin. Very slightly tender. Found 1 week ago. Cant find one on other side. No redness.  No fever. No drainage. No body aches. No headache, congestion, or cough. No fatigue. Has a bug bite close by. She has not tried anything as treatment. It is unchanged.   Outside all day at job- gets lots of bug bites. Has not seen any ticks on her.        The following portions of the patient's history were reviewed and updated as appropriate: allergies, current medications, past family history, past medical history, past social history, past surgical history and problem list.    Review of Systems   Constitutional: Negative for chills, diaphoresis, fatigue, fever and unexpected weight change.   HENT: Negative.    Respiratory: Negative.    Gastrointestinal: Negative.    Genitourinary: Negative.    Musculoskeletal: Negative.    Skin: Negative for color change and pallor.   Neurological: Negative.    Hematological: Positive for adenopathy.   All other systems reviewed and are negative.      Objective   Physical Exam   Constitutional: She appears well-developed.   Cardiovascular: Normal rate and intact distal pulses.    Pulmonary/Chest: Effort normal.   Abdominal: Soft. There is no tenderness.   Lymphadenopathy:        Right: Inguinal (very mild) adenopathy present.   Skin: Skin is warm and dry.   Psychiatric: She has a normal mood and affect. Her behavior is normal. Judgment and thought content normal.   Nursing note and vitals reviewed.      Assessment/Plan   Problems Addressed this Visit     None      Visit Diagnoses     Swollen lymph nodes    -  Primary        Lymph nodes can temporarily swell for many reasons and are  "frequently viral. They also tend to be accidentally found by thin people who find normal sized lymph nodes by chance. Monitor and let us know if it increases in size, becomes \"fixed\", or other changes.          "